# Patient Record
Sex: MALE | Race: BLACK OR AFRICAN AMERICAN | NOT HISPANIC OR LATINO | Employment: OTHER | URBAN - METROPOLITAN AREA
[De-identification: names, ages, dates, MRNs, and addresses within clinical notes are randomized per-mention and may not be internally consistent; named-entity substitution may affect disease eponyms.]

---

## 2017-01-16 ENCOUNTER — HISTORICAL (OUTPATIENT)
Dept: RADIOLOGY | Facility: HOSPITAL | Age: 62
End: 2017-01-16

## 2017-01-16 ENCOUNTER — HISTORICAL (OUTPATIENT)
Dept: ADMINISTRATIVE | Facility: HOSPITAL | Age: 62
End: 2017-01-16

## 2017-01-23 ENCOUNTER — HISTORICAL (OUTPATIENT)
Dept: RADIOLOGY | Facility: HOSPITAL | Age: 62
End: 2017-01-23

## 2017-03-27 ENCOUNTER — HISTORICAL (OUTPATIENT)
Dept: INFUSION THERAPY | Facility: HOSPITAL | Age: 62
End: 2017-03-27

## 2017-03-28 ENCOUNTER — HISTORICAL (OUTPATIENT)
Dept: INFUSION THERAPY | Facility: HOSPITAL | Age: 62
End: 2017-03-28

## 2017-04-10 ENCOUNTER — HISTORICAL (OUTPATIENT)
Dept: HEMATOLOGY/ONCOLOGY | Facility: CLINIC | Age: 62
End: 2017-04-10

## 2017-04-24 ENCOUNTER — HISTORICAL (OUTPATIENT)
Dept: INFUSION THERAPY | Facility: HOSPITAL | Age: 62
End: 2017-04-24

## 2017-04-25 ENCOUNTER — HISTORICAL (OUTPATIENT)
Dept: INFUSION THERAPY | Facility: HOSPITAL | Age: 62
End: 2017-04-25

## 2017-05-01 ENCOUNTER — HISTORICAL (OUTPATIENT)
Dept: ADMINISTRATIVE | Facility: HOSPITAL | Age: 62
End: 2017-05-01

## 2017-05-01 LAB
ABS NEUT (OLG): 5.88 X10(3)/MCL (ref 2.1–9.2)
ALBUMIN SERPL-MCNC: 3.6 GM/DL (ref 3.4–5)
ALBUMIN/GLOB SERPL: 1.2 RATIO (ref 1.1–2)
ALP SERPL-CCNC: 72 UNIT/L (ref 50–136)
ALT SERPL-CCNC: 24 UNIT/L (ref 12–78)
AST SERPL-CCNC: 18 UNIT/L (ref 15–37)
BASOPHILS # BLD AUTO: 0.1 X10(3)/MCL (ref 0–0.2)
BASOPHILS NFR BLD AUTO: 0.8 %
BILIRUB SERPL-MCNC: 0.4 MG/DL (ref 0.2–1)
BILIRUBIN DIRECT+TOT PNL SERPL-MCNC: 0.1 MG/DL (ref 0–0.5)
BILIRUBIN DIRECT+TOT PNL SERPL-MCNC: 0.3 MG/DL (ref 0–0.8)
BUN SERPL-MCNC: 25 MG/DL (ref 7–18)
CALCIUM SERPL-MCNC: 9 MG/DL (ref 8.5–10.1)
CHLORIDE SERPL-SCNC: 107 MMOL/L (ref 98–107)
CO2 SERPL-SCNC: 30 MMOL/L (ref 21–32)
CREAT SERPL-MCNC: 1.44 MG/DL (ref 0.7–1.3)
EOSINOPHIL # BLD AUTO: 0.3 X10(3)/MCL (ref 0–0.9)
EOSINOPHIL NFR BLD AUTO: 3.6 %
ERYTHROCYTE [DISTWIDTH] IN BLOOD BY AUTOMATED COUNT: 14 % (ref 11.5–17)
GLOBULIN SER-MCNC: 3 GM/DL (ref 2.4–3.5)
GLUCOSE SERPL-MCNC: 110 MG/DL (ref 74–106)
HCT VFR BLD AUTO: 45 % (ref 42–52)
HGB BLD-MCNC: 14.7 GM/DL (ref 14–18)
LYMPHOCYTES # BLD AUTO: 0.2 X10(3)/MCL (ref 0.6–4.6)
LYMPHOCYTES NFR BLD AUTO: 2.1 %
MCH RBC QN AUTO: 30.5 PG (ref 27–31)
MCHC RBC AUTO-ENTMCNC: 32.7 GM/DL (ref 33–36)
MCV RBC AUTO: 93.4 FL (ref 80–94)
MONOCYTES # BLD AUTO: 0.8 X10(3)/MCL (ref 0.1–1.3)
MONOCYTES NFR BLD AUTO: 11.1 %
NEUTROPHILS # BLD AUTO: 5.9 X10(3)/MCL (ref 2.1–9.2)
NEUTROPHILS NFR BLD AUTO: 82.4 %
PLATELET # BLD AUTO: 188 X10(3)/MCL (ref 130–400)
PMV BLD AUTO: 9.9 FL (ref 9.4–12.4)
POTASSIUM SERPL-SCNC: 4.4 MMOL/L (ref 3.5–5.1)
PROT SERPL-MCNC: 6.6 GM/DL (ref 6.4–8.2)
RBC # BLD AUTO: 4.82 X10(6)/MCL (ref 4.7–6.1)
SODIUM SERPL-SCNC: 144 MMOL/L (ref 136–145)
WBC # SPEC AUTO: 7.1 X10(3)/MCL (ref 4.5–11.5)

## 2017-05-04 ENCOUNTER — HISTORICAL (OUTPATIENT)
Dept: INFUSION THERAPY | Facility: HOSPITAL | Age: 62
End: 2017-05-04

## 2017-05-10 ENCOUNTER — HISTORICAL (OUTPATIENT)
Dept: INFUSION THERAPY | Facility: HOSPITAL | Age: 62
End: 2017-05-10

## 2017-05-22 ENCOUNTER — HISTORICAL (OUTPATIENT)
Dept: INFUSION THERAPY | Facility: HOSPITAL | Age: 62
End: 2017-05-22

## 2017-05-22 LAB
ABS NEUT (OLG): 4.68 X10(3)/MCL (ref 2.1–9.2)
ANION GAP SERPL CALC-SCNC: 19 MMOL/L
BASOPHILS # BLD AUTO: 0 X10(3)/MCL (ref 0–0.2)
BASOPHILS NFR BLD AUTO: 0.5 %
BUN SERPL-MCNC: 18 MG/DL (ref 7–18)
CHLORIDE SERPL-SCNC: 107 MMOL/L (ref 98–109)
CREAT SERPL-MCNC: 0.9 MG/DL (ref 0.6–1.3)
EOSINOPHIL # BLD AUTO: 0.1 X10(3)/MCL (ref 0–0.9)
EOSINOPHIL NFR BLD AUTO: 2.3 %
ERYTHROCYTE [DISTWIDTH] IN BLOOD BY AUTOMATED COUNT: 13.7 % (ref 11.5–17)
GLUCOSE SERPL-MCNC: 98 MG/DL (ref 70–105)
HCT VFR BLD AUTO: 43.3 % (ref 42–52)
HCT VFR BLD CALC: 43 % (ref 38–51)
HGB BLD-MCNC: 14.4 GM/DL (ref 14–18)
HGB BLD-MCNC: 14.6 MG/DL (ref 12–17)
LYMPHOCYTES # BLD AUTO: 0.3 X10(3)/MCL (ref 0.6–4.6)
LYMPHOCYTES NFR BLD AUTO: 5.6 %
MCH RBC QN AUTO: 30.6 PG (ref 27–31)
MCHC RBC AUTO-ENTMCNC: 33.3 GM/DL (ref 33–36)
MCV RBC AUTO: 91.9 FL (ref 80–94)
MONOCYTES # BLD AUTO: 0.6 X10(3)/MCL (ref 0.1–1.3)
MONOCYTES NFR BLD AUTO: 10.1 %
NEUTROPHILS # BLD AUTO: 4.7 X10(3)/MCL (ref 2.1–9.2)
NEUTROPHILS NFR BLD AUTO: 81.5 %
PLATELET # BLD AUTO: 205 X10(3)/MCL (ref 130–400)
PMV BLD AUTO: 9.9 FL (ref 9.4–12.4)
POC IONIZED CALCIUM: 1.16 MMOL/L (ref 1.12–1.32)
POC TCO2: 23 MMOL/L (ref 22–27)
POTASSIUM BLD-SCNC: 4.2 MMOL/L (ref 3.5–4.9)
RBC # BLD AUTO: 4.71 X10(6)/MCL (ref 4.7–6.1)
SODIUM BLD-SCNC: 143 MMOL/L (ref 138–146)
WBC # SPEC AUTO: 5.7 X10(3)/MCL (ref 4.5–11.5)

## 2017-05-23 ENCOUNTER — HISTORICAL (OUTPATIENT)
Dept: INFUSION THERAPY | Facility: HOSPITAL | Age: 62
End: 2017-05-23

## 2017-05-30 ENCOUNTER — HISTORICAL (OUTPATIENT)
Dept: ADMINISTRATIVE | Facility: HOSPITAL | Age: 62
End: 2017-05-30

## 2017-05-30 LAB
ABS NEUT (OLG): 4.03 X10(3)/MCL (ref 2.1–9.2)
ALBUMIN SERPL-MCNC: 3.5 GM/DL (ref 3.4–5)
ALBUMIN/GLOB SERPL: 1.2 {RATIO}
ALP SERPL-CCNC: 73 UNIT/L (ref 50–136)
ALT SERPL-CCNC: 26 UNIT/L (ref 12–78)
AST SERPL-CCNC: 12 UNIT/L (ref 15–37)
BASOPHILS # BLD AUTO: 0 X10(3)/MCL (ref 0–0.2)
BASOPHILS NFR BLD AUTO: 0.6 %
BILIRUB SERPL-MCNC: 0.3 MG/DL (ref 0.2–1)
BILIRUBIN DIRECT+TOT PNL SERPL-MCNC: 0.1 MG/DL (ref 0–0.2)
BILIRUBIN DIRECT+TOT PNL SERPL-MCNC: 0.2 MG/DL (ref 0–0.8)
BUN SERPL-MCNC: 21 MG/DL (ref 7–18)
CALCIUM SERPL-MCNC: 8.8 MG/DL (ref 8.5–10.1)
CHLORIDE SERPL-SCNC: 106 MMOL/L (ref 98–107)
CO2 SERPL-SCNC: 29 MMOL/L (ref 21–32)
CREAT SERPL-MCNC: 1.17 MG/DL (ref 0.7–1.3)
EOSINOPHIL # BLD AUTO: 0.1 X10(3)/MCL (ref 0–0.9)
EOSINOPHIL NFR BLD AUTO: 2.1 %
ERYTHROCYTE [DISTWIDTH] IN BLOOD BY AUTOMATED COUNT: 14.4 % (ref 11.5–17)
GLOBULIN SER-MCNC: 2.9 GM/DL (ref 2.4–3.5)
GLUCOSE SERPL-MCNC: 86 MG/DL (ref 74–106)
HCT VFR BLD AUTO: 41.6 % (ref 42–52)
HGB BLD-MCNC: 13.8 GM/DL (ref 14–18)
LYMPHOCYTES # BLD AUTO: 0.1 X10(3)/MCL (ref 0.6–4.6)
LYMPHOCYTES NFR BLD AUTO: 2.5 %
MCH RBC QN AUTO: 31.2 PG (ref 27–31)
MCHC RBC AUTO-ENTMCNC: 33.2 GM/DL (ref 33–36)
MCV RBC AUTO: 93.9 FL (ref 80–94)
MONOCYTES # BLD AUTO: 1 X10(3)/MCL (ref 0.1–1.3)
MONOCYTES NFR BLD AUTO: 18.7 %
NEUTROPHILS # BLD AUTO: 4 X10(3)/MCL (ref 2.1–9.2)
NEUTROPHILS NFR BLD AUTO: 76.1 %
PLATELET # BLD AUTO: 204 X10(3)/MCL (ref 130–400)
PMV BLD AUTO: 10 FL (ref 9.4–12.4)
POTASSIUM SERPL-SCNC: 4.1 MMOL/L (ref 3.5–5.1)
PROT SERPL-MCNC: 6.4 GM/DL (ref 6.4–8.2)
RBC # BLD AUTO: 4.43 X10(6)/MCL (ref 4.7–6.1)
SODIUM SERPL-SCNC: 142 MMOL/L (ref 136–145)
WBC # SPEC AUTO: 5.3 X10(3)/MCL (ref 4.5–11.5)

## 2017-06-03 ENCOUNTER — HISTORICAL (OUTPATIENT)
Dept: INFUSION THERAPY | Facility: HOSPITAL | Age: 62
End: 2017-06-03

## 2017-06-19 ENCOUNTER — HISTORICAL (OUTPATIENT)
Dept: INFUSION THERAPY | Facility: HOSPITAL | Age: 62
End: 2017-06-19

## 2017-06-19 LAB
ABS NEUT (OLG): 3.67 X10(3)/MCL (ref 2.1–9.2)
ALBUMIN SERPL-MCNC: 3.4 GM/DL (ref 3.4–5)
ALP SERPL-CCNC: 80 UNIT/L (ref 50–136)
ALT SERPL-CCNC: 30 UNIT/L (ref 12–78)
ANION GAP SERPL CALC-SCNC: 13 MMOL/L
AST SERPL-CCNC: 15 UNIT/L (ref 15–37)
BASOPHILS # BLD AUTO: 0 X10(3)/MCL (ref 0–0.2)
BASOPHILS NFR BLD AUTO: 0.4 %
BILIRUB SERPL-MCNC: 0.3 MG/DL (ref 0.2–1)
BILIRUBIN DIRECT+TOT PNL SERPL-MCNC: 0.1 MG/DL (ref 0–0.5)
BILIRUBIN DIRECT+TOT PNL SERPL-MCNC: 0.2 MG/DL (ref 0–0.8)
BUN SERPL-MCNC: 22 MG/DL (ref 7–18)
CHLORIDE SERPL-SCNC: 106 MMOL/L (ref 98–109)
CREAT SERPL-MCNC: 1.1 MG/DL (ref 0.6–1.3)
EOSINOPHIL # BLD AUTO: 0.1 X10(3)/MCL (ref 0–0.9)
EOSINOPHIL NFR BLD AUTO: 2.3 %
ERYTHROCYTE [DISTWIDTH] IN BLOOD BY AUTOMATED COUNT: 13.7 % (ref 11.5–17)
GLUCOSE SERPL-MCNC: 116 MG/DL (ref 70–105)
HCT VFR BLD AUTO: 41.4 % (ref 42–52)
HCT VFR BLD CALC: 41 % (ref 38–51)
HGB BLD-MCNC: 13.9 MG/DL (ref 12–17)
HGB BLD-MCNC: 14.3 GM/DL (ref 14–18)
LIVER PROFILE INTERP: NORMAL
LYMPHOCYTES # BLD AUTO: 0.3 X10(3)/MCL (ref 0.6–4.6)
LYMPHOCYTES NFR BLD AUTO: 6.6 %
MCH RBC QN AUTO: 31.3 PG (ref 27–31)
MCHC RBC AUTO-ENTMCNC: 34.5 GM/DL (ref 33–36)
MCV RBC AUTO: 90.6 FL (ref 80–94)
MONOCYTES # BLD AUTO: 0.6 X10(3)/MCL (ref 0.1–1.3)
MONOCYTES NFR BLD AUTO: 12.7 %
NEUTROPHILS # BLD AUTO: 3.7 X10(3)/MCL (ref 2.1–9.2)
NEUTROPHILS NFR BLD AUTO: 78 %
PLATELET # BLD AUTO: 179 X10(3)/MCL (ref 130–400)
PMV BLD AUTO: 9.8 FL (ref 9.4–12.4)
POC IONIZED CALCIUM: 1.23 MMOL/L (ref 1.12–1.32)
POC TCO2: 30 MMOL/L (ref 22–27)
POTASSIUM BLD-SCNC: 3.6 MMOL/L (ref 3.5–4.9)
PROT SERPL-MCNC: 6.5 GM/DL (ref 6.4–8.2)
RBC # BLD AUTO: 4.57 X10(6)/MCL (ref 4.7–6.1)
SODIUM BLD-SCNC: 144 MMOL/L (ref 138–146)
WBC # SPEC AUTO: 4.7 X10(3)/MCL (ref 4.5–11.5)

## 2017-06-20 ENCOUNTER — HISTORICAL (OUTPATIENT)
Dept: INFUSION THERAPY | Facility: HOSPITAL | Age: 62
End: 2017-06-20

## 2017-06-26 ENCOUNTER — HISTORICAL (OUTPATIENT)
Dept: INFUSION THERAPY | Facility: HOSPITAL | Age: 62
End: 2017-06-26

## 2017-06-26 LAB
ABS NEUT (OLG): 6.06 X10(3)/MCL (ref 2.1–9.2)
ALBUMIN SERPL-MCNC: 3.6 GM/DL (ref 3.4–5)
ALBUMIN/GLOB SERPL: 1.3 {RATIO}
ALP SERPL-CCNC: 72 UNIT/L (ref 50–136)
ALT SERPL-CCNC: 31 UNIT/L (ref 12–78)
AST SERPL-CCNC: 16 UNIT/L (ref 15–37)
BASOPHILS # BLD AUTO: 0 X10(3)/MCL (ref 0–0.2)
BASOPHILS NFR BLD AUTO: 0.1 %
BILIRUB SERPL-MCNC: 0.4 MG/DL (ref 0.2–1)
BILIRUBIN DIRECT+TOT PNL SERPL-MCNC: 0.1 MG/DL (ref 0–0.2)
BILIRUBIN DIRECT+TOT PNL SERPL-MCNC: 0.3 MG/DL (ref 0–0.8)
BUN SERPL-MCNC: 20 MG/DL (ref 7–18)
CALCIUM SERPL-MCNC: 9.2 MG/DL (ref 8.5–10.1)
CHLORIDE SERPL-SCNC: 103 MMOL/L (ref 98–107)
CO2 SERPL-SCNC: 32 MMOL/L (ref 21–32)
CREAT SERPL-MCNC: 1.29 MG/DL (ref 0.7–1.3)
EOSINOPHIL # BLD AUTO: 0.1 X10(3)/MCL (ref 0–0.9)
EOSINOPHIL NFR BLD AUTO: 1.4 %
ERYTHROCYTE [DISTWIDTH] IN BLOOD BY AUTOMATED COUNT: 14 % (ref 11.5–17)
GLOBULIN SER-MCNC: 2.7 GM/DL (ref 2.4–3.5)
GLUCOSE SERPL-MCNC: 72 MG/DL (ref 74–106)
HCT VFR BLD AUTO: 37.9 % (ref 42–52)
HGB BLD-MCNC: 13.3 GM/DL (ref 14–18)
LYMPHOCYTES # BLD AUTO: 0.2 X10(3)/MCL (ref 0.6–4.6)
LYMPHOCYTES NFR BLD AUTO: 2.5 %
MCH RBC QN AUTO: 31.7 PG (ref 27–31)
MCHC RBC AUTO-ENTMCNC: 35.1 GM/DL (ref 33–36)
MCV RBC AUTO: 90.5 FL (ref 80–94)
MONOCYTES # BLD AUTO: 0.7 X10(3)/MCL (ref 0.1–1.3)
MONOCYTES NFR BLD AUTO: 10.3 %
NEUTROPHILS # BLD AUTO: 6.1 X10(3)/MCL (ref 2.1–9.2)
NEUTROPHILS NFR BLD AUTO: 85.7 %
PLATELET # BLD AUTO: 212 X10(3)/MCL (ref 130–400)
PMV BLD AUTO: 9.5 FL (ref 9.4–12.4)
POTASSIUM SERPL-SCNC: 3.8 MMOL/L (ref 3.5–5.1)
PROT SERPL-MCNC: 6.3 GM/DL (ref 6.4–8.2)
RBC # BLD AUTO: 4.19 X10(6)/MCL (ref 4.7–6.1)
SODIUM SERPL-SCNC: 142 MMOL/L (ref 136–145)
WBC # SPEC AUTO: 7.1 X10(3)/MCL (ref 4.5–11.5)

## 2017-07-03 ENCOUNTER — HISTORICAL (OUTPATIENT)
Dept: HEMATOLOGY/ONCOLOGY | Facility: CLINIC | Age: 62
End: 2017-07-03

## 2017-07-03 LAB
ABS NEUT (OLG): 3.93 X10(3)/MCL (ref 2.1–9.2)
ALBUMIN SERPL-MCNC: 3.5 GM/DL (ref 3.4–5)
ALBUMIN/GLOB SERPL: 1.2 {RATIO}
ALP SERPL-CCNC: 77 UNIT/L (ref 50–136)
ALT SERPL-CCNC: 26 UNIT/L (ref 12–78)
AST SERPL-CCNC: 17 UNIT/L (ref 15–37)
BASOPHILS # BLD AUTO: 0 X10(3)/MCL (ref 0–0.2)
BASOPHILS NFR BLD AUTO: 0.4 %
BILIRUB SERPL-MCNC: 0.5 MG/DL (ref 0.2–1)
BILIRUBIN DIRECT+TOT PNL SERPL-MCNC: 0.1 MG/DL (ref 0–0.2)
BILIRUBIN DIRECT+TOT PNL SERPL-MCNC: 0.4 MG/DL (ref 0–0.8)
BUN SERPL-MCNC: 19 MG/DL (ref 7–18)
CALCIUM SERPL-MCNC: 8.8 MG/DL (ref 8.5–10.1)
CHLORIDE SERPL-SCNC: 109 MMOL/L (ref 98–107)
CO2 SERPL-SCNC: 25 MMOL/L (ref 21–32)
CREAT SERPL-MCNC: 1.2 MG/DL (ref 0.7–1.3)
EOSINOPHIL # BLD AUTO: 0.1 X10(3)/MCL (ref 0–0.9)
EOSINOPHIL NFR BLD AUTO: 1.2 %
ERYTHROCYTE [DISTWIDTH] IN BLOOD BY AUTOMATED COUNT: 14.3 % (ref 11.5–17)
GLOBULIN SER-MCNC: 2.8 GM/DL (ref 2.4–3.5)
GLUCOSE SERPL-MCNC: 74 MG/DL (ref 74–106)
HCT VFR BLD AUTO: 37.6 % (ref 42–52)
HGB BLD-MCNC: 12.8 GM/DL (ref 14–18)
LYMPHOCYTES # BLD AUTO: 0.1 X10(3)/MCL (ref 0.6–4.6)
LYMPHOCYTES NFR BLD AUTO: 2.8 %
MCH RBC QN AUTO: 31.3 PG (ref 27–31)
MCHC RBC AUTO-ENTMCNC: 34 GM/DL (ref 33–36)
MCV RBC AUTO: 91.9 FL (ref 80–94)
MONOCYTES # BLD AUTO: 0.8 X10(3)/MCL (ref 0.1–1.3)
MONOCYTES NFR BLD AUTO: 16.3 %
NEUTROPHILS # BLD AUTO: 3.9 X10(3)/MCL (ref 2.1–9.2)
NEUTROPHILS NFR BLD AUTO: 79.3 %
PLATELET # BLD AUTO: 200 X10(3)/MCL (ref 130–400)
PMV BLD AUTO: 9.4 FL (ref 9.4–12.4)
POTASSIUM SERPL-SCNC: 3.7 MMOL/L (ref 3.5–5.1)
PROT SERPL-MCNC: 6.3 GM/DL (ref 6.4–8.2)
RBC # BLD AUTO: 4.09 X10(6)/MCL (ref 4.7–6.1)
SODIUM SERPL-SCNC: 141 MMOL/L (ref 136–145)
WBC # SPEC AUTO: 5 X10(3)/MCL (ref 4.5–11.5)

## 2017-07-17 ENCOUNTER — HISTORICAL (OUTPATIENT)
Dept: INFUSION THERAPY | Facility: HOSPITAL | Age: 62
End: 2017-07-17

## 2017-07-17 LAB
ABS NEUT (OLG): 6.54 X10(3)/MCL (ref 2.1–9.2)
ANION GAP SERPL CALC-SCNC: 17 MMOL/L
BASOPHILS # BLD AUTO: 0 X10(3)/MCL (ref 0–0.2)
BASOPHILS NFR BLD AUTO: 0.3 %
BUN SERPL-MCNC: 16 MG/DL (ref 7–18)
CHLORIDE SERPL-SCNC: 107 MMOL/L (ref 98–109)
CREAT SERPL-MCNC: 1 MG/DL (ref 0.6–1.3)
EOSINOPHIL # BLD AUTO: 0 X10(3)/MCL (ref 0–0.9)
EOSINOPHIL NFR BLD AUTO: 0.6 %
ERYTHROCYTE [DISTWIDTH] IN BLOOD BY AUTOMATED COUNT: 14.2 % (ref 11.5–17)
GLUCOSE SERPL-MCNC: 95 MG/DL (ref 70–105)
HCT VFR BLD AUTO: 39.6 % (ref 42–52)
HCT VFR BLD CALC: 39 % (ref 38–51)
HGB BLD-MCNC: 13.3 MG/DL (ref 12–17)
HGB BLD-MCNC: 13.6 GM/DL (ref 14–18)
LYMPHOCYTES # BLD AUTO: 0.3 X10(3)/MCL (ref 0.6–4.6)
LYMPHOCYTES NFR BLD AUTO: 3.5 %
MCH RBC QN AUTO: 31.6 PG (ref 27–31)
MCHC RBC AUTO-ENTMCNC: 34.3 GM/DL (ref 33–36)
MCV RBC AUTO: 92.1 FL (ref 80–94)
MONOCYTES # BLD AUTO: 0.9 X10(3)/MCL (ref 0.1–1.3)
MONOCYTES NFR BLD AUTO: 11.2 %
NEUTROPHILS # BLD AUTO: 6.5 X10(3)/MCL (ref 2.1–9.2)
NEUTROPHILS NFR BLD AUTO: 84.4 %
PLATELET # BLD AUTO: 216 X10(3)/MCL (ref 130–400)
PMV BLD AUTO: 9.8 FL (ref 9.4–12.4)
POC IONIZED CALCIUM: 1.22 MMOL/L (ref 1.12–1.32)
POC TCO2: 25 MMOL/L (ref 22–27)
POTASSIUM BLD-SCNC: 3.6 MMOL/L (ref 3.5–4.9)
RBC # BLD AUTO: 4.3 X10(6)/MCL (ref 4.7–6.1)
SODIUM BLD-SCNC: 144 MMOL/L (ref 138–146)
WBC # SPEC AUTO: 7.8 X10(3)/MCL (ref 4.5–11.5)

## 2017-07-18 ENCOUNTER — HISTORICAL (OUTPATIENT)
Dept: INFUSION THERAPY | Facility: HOSPITAL | Age: 62
End: 2017-07-18

## 2017-07-24 ENCOUNTER — HISTORICAL (OUTPATIENT)
Dept: ADMINISTRATIVE | Facility: HOSPITAL | Age: 62
End: 2017-07-24

## 2017-07-24 LAB
ABS NEUT (OLG): 4.28 X10(3)/MCL (ref 2.1–9.2)
ALBUMIN SERPL-MCNC: 3.3 GM/DL (ref 3.4–5)
ALBUMIN/GLOB SERPL: 1.1 RATIO (ref 1.1–2)
ALP SERPL-CCNC: 75 UNIT/L (ref 50–136)
ALT SERPL-CCNC: 32 UNIT/L (ref 12–78)
AST SERPL-CCNC: 19 UNIT/L (ref 15–37)
BASOPHILS # BLD AUTO: 0 X10(3)/MCL (ref 0–0.2)
BASOPHILS NFR BLD AUTO: 0.2 %
BILIRUB SERPL-MCNC: 0.5 MG/DL (ref 0.2–1)
BILIRUBIN DIRECT+TOT PNL SERPL-MCNC: 0.1 MG/DL (ref 0–0.5)
BILIRUBIN DIRECT+TOT PNL SERPL-MCNC: 0.4 MG/DL (ref 0–0.8)
BUN SERPL-MCNC: 17 MG/DL (ref 7–18)
CALCIUM SERPL-MCNC: 9.2 MG/DL (ref 8.5–10.1)
CHLORIDE SERPL-SCNC: 112 MMOL/L (ref 98–107)
CO2 SERPL-SCNC: 27 MMOL/L (ref 21–32)
CREAT SERPL-MCNC: 1.16 MG/DL (ref 0.7–1.3)
EOSINOPHIL # BLD AUTO: 0 X10(3)/MCL (ref 0–0.9)
EOSINOPHIL NFR BLD AUTO: 0.6 %
ERYTHROCYTE [DISTWIDTH] IN BLOOD BY AUTOMATED COUNT: 14.5 % (ref 11.5–17)
GLOBULIN SER-MCNC: 3 GM/DL (ref 2.4–3.5)
GLUCOSE SERPL-MCNC: 85 MG/DL (ref 74–106)
HCT VFR BLD AUTO: 37.1 % (ref 42–52)
HGB BLD-MCNC: 12.7 GM/DL (ref 14–18)
LYMPHOCYTES # BLD AUTO: 0.1 X10(3)/MCL (ref 0.6–4.6)
LYMPHOCYTES NFR BLD AUTO: 2.5 %
MCH RBC QN AUTO: 31.7 PG (ref 27–31)
MCHC RBC AUTO-ENTMCNC: 34.2 GM/DL (ref 33–36)
MCV RBC AUTO: 92.5 FL (ref 80–94)
MONOCYTES # BLD AUTO: 0.7 X10(3)/MCL (ref 0.1–1.3)
MONOCYTES NFR BLD AUTO: 13.8 %
NEUTROPHILS # BLD AUTO: 4.3 X10(3)/MCL (ref 2.1–9.2)
NEUTROPHILS NFR BLD AUTO: 82.9 %
PLATELET # BLD AUTO: 219 X10(3)/MCL (ref 130–400)
PMV BLD AUTO: 9.7 FL (ref 9.4–12.4)
POTASSIUM SERPL-SCNC: 3.7 MMOL/L (ref 3.5–5.1)
PROT SERPL-MCNC: 6.3 GM/DL (ref 6.4–8.2)
RBC # BLD AUTO: 4.01 X10(6)/MCL (ref 4.7–6.1)
SODIUM SERPL-SCNC: 145 MMOL/L (ref 136–145)
WBC # SPEC AUTO: 5.2 X10(3)/MCL (ref 4.5–11.5)

## 2017-07-31 ENCOUNTER — HISTORICAL (OUTPATIENT)
Dept: HEMATOLOGY/ONCOLOGY | Facility: CLINIC | Age: 62
End: 2017-07-31

## 2017-07-31 LAB
ABS NEUT (OLG): 4.79 X10(3)/MCL (ref 2.1–9.2)
ALBUMIN SERPL-MCNC: 3.4 GM/DL (ref 3.4–5)
ALBUMIN/GLOB SERPL: 1.2 {RATIO}
ALP SERPL-CCNC: 84 UNIT/L (ref 50–136)
ALT SERPL-CCNC: 29 UNIT/L (ref 12–78)
AST SERPL-CCNC: 17 UNIT/L (ref 15–37)
BASOPHILS # BLD AUTO: 0 X10(3)/MCL (ref 0–0.2)
BASOPHILS NFR BLD AUTO: 0.2 %
BILIRUB SERPL-MCNC: 0.3 MG/DL (ref 0.2–1)
BILIRUBIN DIRECT+TOT PNL SERPL-MCNC: 0.1 MG/DL (ref 0–0.2)
BILIRUBIN DIRECT+TOT PNL SERPL-MCNC: 0.2 MG/DL (ref 0–0.8)
BUN SERPL-MCNC: 18 MG/DL (ref 7–18)
CALCIUM SERPL-MCNC: 8.6 MG/DL (ref 8.5–10.1)
CHLORIDE SERPL-SCNC: 108 MMOL/L (ref 98–107)
CO2 SERPL-SCNC: 25 MMOL/L (ref 21–32)
CREAT SERPL-MCNC: 1.11 MG/DL (ref 0.7–1.3)
EOSINOPHIL # BLD AUTO: 0 X10(3)/MCL (ref 0–0.9)
EOSINOPHIL NFR BLD AUTO: 0.7 %
ERYTHROCYTE [DISTWIDTH] IN BLOOD BY AUTOMATED COUNT: 14.6 % (ref 11.5–17)
GLOBULIN SER-MCNC: 2.9 GM/DL (ref 2.4–3.5)
GLUCOSE SERPL-MCNC: 91 MG/DL (ref 74–106)
HCT VFR BLD AUTO: 35.9 % (ref 42–52)
HGB BLD-MCNC: 12.2 GM/DL (ref 14–18)
LYMPHOCYTES # BLD AUTO: 0.2 X10(3)/MCL (ref 0.6–4.6)
LYMPHOCYTES NFR BLD AUTO: 3.4 %
MCH RBC QN AUTO: 31.8 PG (ref 27–31)
MCHC RBC AUTO-ENTMCNC: 34 GM/DL (ref 33–36)
MCV RBC AUTO: 93.5 FL (ref 80–94)
MONOCYTES # BLD AUTO: 0.8 X10(3)/MCL (ref 0.1–1.3)
MONOCYTES NFR BLD AUTO: 13.7 %
NEUTROPHILS # BLD AUTO: 4.8 X10(3)/MCL (ref 2.1–9.2)
NEUTROPHILS NFR BLD AUTO: 82 %
PLATELET # BLD AUTO: 250 X10(3)/MCL (ref 130–400)
PMV BLD AUTO: 10 FL (ref 9.4–12.4)
POTASSIUM SERPL-SCNC: 3.3 MMOL/L (ref 3.5–5.1)
PROT SERPL-MCNC: 6.3 GM/DL (ref 6.4–8.2)
RBC # BLD AUTO: 3.84 X10(6)/MCL (ref 4.7–6.1)
SODIUM SERPL-SCNC: 144 MMOL/L (ref 136–145)
WBC # SPEC AUTO: 5.8 X10(3)/MCL (ref 4.5–11.5)

## 2017-08-14 ENCOUNTER — HISTORICAL (OUTPATIENT)
Dept: INFUSION THERAPY | Facility: HOSPITAL | Age: 62
End: 2017-08-14

## 2017-08-14 LAB
ABS NEUT (OLG): 5.46 X10(3)/MCL (ref 2.1–9.2)
ANION GAP SERPL CALC-SCNC: 17 MMOL/L
BASOPHILS # BLD AUTO: 0 X10(3)/MCL (ref 0–0.2)
BASOPHILS NFR BLD AUTO: 0.2 %
BUN SERPL-MCNC: 18 MG/DL (ref 7–18)
CHLORIDE SERPL-SCNC: 108 MMOL/L (ref 98–109)
CREAT SERPL-MCNC: 1.1 MG/DL (ref 0.6–1.3)
EOSINOPHIL # BLD AUTO: 0 X10(3)/MCL (ref 0–0.9)
EOSINOPHIL NFR BLD AUTO: 0.6 %
ERYTHROCYTE [DISTWIDTH] IN BLOOD BY AUTOMATED COUNT: 14.9 % (ref 11.5–17)
GLUCOSE SERPL-MCNC: 113 MG/DL (ref 70–105)
HCT VFR BLD AUTO: 40.1 % (ref 42–52)
HCT VFR BLD CALC: 41 % (ref 38–51)
HGB BLD-MCNC: 13.6 GM/DL (ref 14–18)
HGB BLD-MCNC: 13.9 MG/DL (ref 12–17)
LYMPHOCYTES # BLD AUTO: 0.3 X10(3)/MCL (ref 0.6–4.6)
LYMPHOCYTES NFR BLD AUTO: 4.2 %
MCH RBC QN AUTO: 32.4 PG (ref 27–31)
MCHC RBC AUTO-ENTMCNC: 33.9 GM/DL (ref 33–36)
MCV RBC AUTO: 95.5 FL (ref 80–94)
MONOCYTES # BLD AUTO: 0.6 X10(3)/MCL (ref 0.1–1.3)
MONOCYTES NFR BLD AUTO: 10 %
NEUTROPHILS # BLD AUTO: 5.5 X10(3)/MCL (ref 2.1–9.2)
NEUTROPHILS NFR BLD AUTO: 85 %
PLATELET # BLD AUTO: 214 X10(3)/MCL (ref 130–400)
PMV BLD AUTO: 9.9 FL (ref 9.4–12.4)
POC IONIZED CALCIUM: 1.22 MMOL/L (ref 1.12–1.32)
POC TCO2: 25 MMOL/L (ref 22–27)
POTASSIUM BLD-SCNC: 3.7 MMOL/L (ref 3.5–4.9)
RBC # BLD AUTO: 4.2 X10(6)/MCL (ref 4.7–6.1)
SODIUM BLD-SCNC: 145 MMOL/L (ref 138–146)
WBC # SPEC AUTO: 6.4 X10(3)/MCL (ref 4.5–11.5)

## 2017-08-15 ENCOUNTER — HISTORICAL (OUTPATIENT)
Dept: INFUSION THERAPY | Facility: HOSPITAL | Age: 62
End: 2017-08-15

## 2017-08-21 ENCOUNTER — HISTORICAL (OUTPATIENT)
Dept: ADMINISTRATIVE | Facility: HOSPITAL | Age: 62
End: 2017-08-21

## 2017-08-21 LAB
ABS NEUT (OLG): 5 X10(3)/MCL (ref 2.1–9.2)
ALBUMIN SERPL-MCNC: 3.5 GM/DL (ref 3.4–5)
ALBUMIN/GLOB SERPL: 1.2 RATIO (ref 1.1–2)
ALP SERPL-CCNC: 75 UNIT/L (ref 50–136)
ALT SERPL-CCNC: 22 UNIT/L (ref 12–78)
AST SERPL-CCNC: 15 UNIT/L (ref 15–37)
BASOPHILS # BLD AUTO: 0 X10(3)/MCL (ref 0–0.2)
BASOPHILS NFR BLD AUTO: 0.2 %
BILIRUB SERPL-MCNC: 0.4 MG/DL (ref 0.2–1)
BILIRUBIN DIRECT+TOT PNL SERPL-MCNC: 0.1 MG/DL (ref 0–0.5)
BILIRUBIN DIRECT+TOT PNL SERPL-MCNC: 0.3 MG/DL (ref 0–0.8)
BUN SERPL-MCNC: 14 MG/DL (ref 7–18)
CALCIUM SERPL-MCNC: 8.7 MG/DL (ref 8.5–10.1)
CHLORIDE SERPL-SCNC: 110 MMOL/L (ref 98–107)
CO2 SERPL-SCNC: 28 MMOL/L (ref 21–32)
CREAT SERPL-MCNC: 1.18 MG/DL (ref 0.7–1.3)
EOSINOPHIL # BLD AUTO: 0 X10(3)/MCL (ref 0–0.9)
EOSINOPHIL NFR BLD AUTO: 0.7 %
ERYTHROCYTE [DISTWIDTH] IN BLOOD BY AUTOMATED COUNT: 14.7 % (ref 11.5–17)
GLOBULIN SER-MCNC: 2.9 GM/DL (ref 2.4–3.5)
GLUCOSE SERPL-MCNC: 91 MG/DL (ref 74–106)
HCT VFR BLD AUTO: 37.3 % (ref 42–52)
HGB BLD-MCNC: 12.6 GM/DL (ref 14–18)
LYMPHOCYTES # BLD AUTO: 0.1 X10(3)/MCL (ref 0.6–4.6)
LYMPHOCYTES NFR BLD AUTO: 1 %
MCH RBC QN AUTO: 32.5 PG (ref 27–31)
MCHC RBC AUTO-ENTMCNC: 33.8 GM/DL (ref 33–36)
MCV RBC AUTO: 96.1 FL (ref 80–94)
MONOCYTES # BLD AUTO: 0.8 X10(3)/MCL (ref 0.1–1.3)
MONOCYTES NFR BLD AUTO: 12.9 %
NEUTROPHILS # BLD AUTO: 5 X10(3)/MCL (ref 2.1–9.2)
NEUTROPHILS NFR BLD AUTO: 85.2 %
PLATELET # BLD AUTO: 195 X10(3)/MCL (ref 130–400)
PMV BLD AUTO: 9.6 FL (ref 9.4–12.4)
POTASSIUM SERPL-SCNC: 3.6 MMOL/L (ref 3.5–5.1)
PROT SERPL-MCNC: 6.4 GM/DL (ref 6.4–8.2)
RBC # BLD AUTO: 3.88 X10(6)/MCL (ref 4.7–6.1)
SODIUM SERPL-SCNC: 146 MMOL/L (ref 136–145)
WBC # SPEC AUTO: 5.9 X10(3)/MCL (ref 4.5–11.5)

## 2017-09-07 ENCOUNTER — HISTORICAL (OUTPATIENT)
Dept: INFUSION THERAPY | Facility: HOSPITAL | Age: 62
End: 2017-09-07

## 2017-10-12 ENCOUNTER — HISTORICAL (OUTPATIENT)
Dept: INFUSION THERAPY | Facility: HOSPITAL | Age: 62
End: 2017-10-12

## 2017-10-18 ENCOUNTER — HISTORICAL (OUTPATIENT)
Dept: INFUSION THERAPY | Facility: HOSPITAL | Age: 62
End: 2017-10-18

## 2017-12-14 ENCOUNTER — HISTORICAL (OUTPATIENT)
Dept: INFUSION THERAPY | Facility: HOSPITAL | Age: 62
End: 2017-12-14

## 2017-12-20 ENCOUNTER — HISTORICAL (OUTPATIENT)
Dept: ADMINISTRATIVE | Facility: HOSPITAL | Age: 62
End: 2017-12-20

## 2017-12-20 LAB
ABS NEUT (OLG): 6.34 X10(3)/MCL (ref 2.1–9.2)
ALBUMIN SERPL-MCNC: 3.6 GM/DL (ref 3.4–5)
ALBUMIN/GLOB SERPL: 1.2 RATIO (ref 1.1–2)
ALP SERPL-CCNC: 82 UNIT/L (ref 50–136)
ALT SERPL-CCNC: 19 UNIT/L (ref 12–78)
AST SERPL-CCNC: 12 UNIT/L (ref 15–37)
BASOPHILS # BLD AUTO: 0 X10(3)/MCL (ref 0–0.2)
BASOPHILS NFR BLD AUTO: 0.2 %
BILIRUB SERPL-MCNC: 0.3 MG/DL (ref 0.2–1)
BILIRUBIN DIRECT+TOT PNL SERPL-MCNC: 0.1 MG/DL (ref 0–0.5)
BILIRUBIN DIRECT+TOT PNL SERPL-MCNC: 0.2 MG/DL (ref 0–0.8)
BUN SERPL-MCNC: 18 MG/DL (ref 7–18)
CALCIUM SERPL-MCNC: 8.9 MG/DL (ref 8.5–10.1)
CHLORIDE SERPL-SCNC: 107 MMOL/L (ref 98–107)
CO2 SERPL-SCNC: 26 MMOL/L (ref 21–32)
CREAT SERPL-MCNC: 1.07 MG/DL (ref 0.7–1.3)
EOSINOPHIL # BLD AUTO: 0 X10(3)/MCL (ref 0–0.9)
EOSINOPHIL NFR BLD AUTO: 0.3 %
ERYTHROCYTE [DISTWIDTH] IN BLOOD BY AUTOMATED COUNT: 12.7 % (ref 11.5–17)
GLOBULIN SER-MCNC: 3 GM/DL (ref 2.4–3.5)
GLUCOSE SERPL-MCNC: 79 MG/DL (ref 74–106)
HCT VFR BLD AUTO: 39.1 % (ref 42–52)
HGB BLD-MCNC: 12.9 GM/DL (ref 14–18)
LDH SERPL-CCNC: 123 UNIT/L (ref 87–241)
LYMPHOCYTES # BLD AUTO: 0.1 X10(3)/MCL (ref 0.6–4.6)
LYMPHOCYTES NFR BLD AUTO: 2 %
MCH RBC QN AUTO: 32.6 PG (ref 27–31)
MCHC RBC AUTO-ENTMCNC: 33 GM/DL (ref 33–36)
MCV RBC AUTO: 98.7 FL (ref 80–94)
MONOCYTES # BLD AUTO: 0.2 X10(3)/MCL (ref 0.1–1.3)
MONOCYTES NFR BLD AUTO: 2.3 %
NEUTROPHILS # BLD AUTO: 6.3 X10(3)/MCL (ref 2.1–9.2)
NEUTROPHILS NFR BLD AUTO: 95.2 %
PLATELET # BLD AUTO: 240 X10(3)/MCL (ref 130–400)
PMV BLD AUTO: 9.5 FL (ref 9.4–12.4)
POTASSIUM SERPL-SCNC: 3.8 MMOL/L (ref 3.5–5.1)
PROT SERPL-MCNC: 6.6 GM/DL (ref 6.4–8.2)
RBC # BLD AUTO: 3.96 X10(6)/MCL (ref 4.7–6.1)
SODIUM SERPL-SCNC: 143 MMOL/L (ref 136–145)
WBC # SPEC AUTO: 6.6 X10(3)/MCL (ref 4.5–11.5)

## 2018-02-07 ENCOUNTER — HISTORICAL (OUTPATIENT)
Dept: INFUSION THERAPY | Facility: HOSPITAL | Age: 63
End: 2018-02-07

## 2018-04-04 ENCOUNTER — HISTORICAL (OUTPATIENT)
Dept: INFUSION THERAPY | Facility: HOSPITAL | Age: 63
End: 2018-04-04

## 2018-04-24 ENCOUNTER — HISTORICAL (OUTPATIENT)
Dept: ADMINISTRATIVE | Facility: HOSPITAL | Age: 63
End: 2018-04-24

## 2018-04-24 LAB
ABS NEUT (OLG): 3.72 X10(3)/MCL (ref 2.1–9.2)
ALBUMIN SERPL-MCNC: 3.8 GM/DL (ref 3.4–5)
ALBUMIN/GLOB SERPL: 1.5 RATIO (ref 1.1–2)
ALP SERPL-CCNC: 69 UNIT/L (ref 50–136)
ALT SERPL-CCNC: 27 UNIT/L (ref 12–78)
AST SERPL-CCNC: 19 UNIT/L (ref 15–37)
BASOPHILS # BLD AUTO: 0 X10(3)/MCL (ref 0–0.2)
BASOPHILS NFR BLD AUTO: 0.4 %
BILIRUB SERPL-MCNC: 0.8 MG/DL (ref 0.2–1)
BILIRUBIN DIRECT+TOT PNL SERPL-MCNC: 0.1 MG/DL (ref 0–0.5)
BILIRUBIN DIRECT+TOT PNL SERPL-MCNC: 0.7 MG/DL (ref 0–0.8)
BUN SERPL-MCNC: 15 MG/DL (ref 7–18)
CALCIUM SERPL-MCNC: 9.2 MG/DL (ref 8.5–10.1)
CHLORIDE SERPL-SCNC: 111 MMOL/L (ref 98–107)
CO2 SERPL-SCNC: 26 MMOL/L (ref 21–32)
CREAT SERPL-MCNC: 1.07 MG/DL (ref 0.7–1.3)
EOSINOPHIL # BLD AUTO: 0.1 X10(3)/MCL (ref 0–0.9)
EOSINOPHIL NFR BLD AUTO: 1.3 %
ERYTHROCYTE [DISTWIDTH] IN BLOOD BY AUTOMATED COUNT: 13.2 % (ref 11.5–17)
GLOBULIN SER-MCNC: 2.6 GM/DL (ref 2.4–3.5)
GLUCOSE SERPL-MCNC: 84 MG/DL (ref 74–106)
HCT VFR BLD AUTO: 39.6 % (ref 42–52)
HGB BLD-MCNC: 13.2 GM/DL (ref 14–18)
LDH SERPL-CCNC: 161 UNIT/L (ref 87–241)
LYMPHOCYTES # BLD AUTO: 0.3 X10(3)/MCL (ref 0.6–4.6)
LYMPHOCYTES NFR BLD AUTO: 7.3 %
MCH RBC QN AUTO: 32.3 PG (ref 27–31)
MCHC RBC AUTO-ENTMCNC: 33.3 GM/DL (ref 33–36)
MCV RBC AUTO: 96.8 FL (ref 80–94)
MONOCYTES # BLD AUTO: 0.5 X10(3)/MCL (ref 0.1–1.3)
MONOCYTES NFR BLD AUTO: 11.5 %
NEUTROPHILS # BLD AUTO: 3.7 X10(3)/MCL (ref 2.1–9.2)
NEUTROPHILS NFR BLD AUTO: 79.5 %
PLATELET # BLD AUTO: 224 X10(3)/MCL (ref 130–400)
PMV BLD AUTO: 9.2 FL (ref 9.4–12.4)
POTASSIUM SERPL-SCNC: 3.7 MMOL/L (ref 3.5–5.1)
PROT SERPL-MCNC: 6.4 GM/DL (ref 6.4–8.2)
RBC # BLD AUTO: 4.09 X10(6)/MCL (ref 4.7–6.1)
SODIUM SERPL-SCNC: 143 MMOL/L (ref 136–145)
WBC # SPEC AUTO: 4.7 X10(3)/MCL (ref 4.5–11.5)

## 2018-07-05 ENCOUNTER — HISTORICAL (OUTPATIENT)
Dept: INFUSION THERAPY | Facility: HOSPITAL | Age: 63
End: 2018-07-05

## 2018-07-24 ENCOUNTER — HISTORICAL (OUTPATIENT)
Dept: ADMINISTRATIVE | Facility: HOSPITAL | Age: 63
End: 2018-07-24

## 2018-07-24 LAB
ABS NEUT (OLG): 4.73 X10(3)/MCL (ref 2.1–9.2)
ALBUMIN SERPL-MCNC: 3.4 GM/DL (ref 3.4–5)
ALBUMIN/GLOB SERPL: 1.1 {RATIO}
ALP SERPL-CCNC: 83 UNIT/L (ref 50–136)
ALT SERPL-CCNC: 28 UNIT/L (ref 12–78)
AST SERPL-CCNC: 14 UNIT/L (ref 15–37)
BASOPHILS # BLD AUTO: 0 X10(3)/MCL (ref 0–0.2)
BASOPHILS NFR BLD AUTO: 0.2 %
BILIRUB SERPL-MCNC: 0.3 MG/DL (ref 0.2–1)
BILIRUBIN DIRECT+TOT PNL SERPL-MCNC: 0.1 MG/DL (ref 0–0.2)
BILIRUBIN DIRECT+TOT PNL SERPL-MCNC: 0.2 MG/DL (ref 0–0.8)
BUN SERPL-MCNC: 21 MG/DL (ref 7–18)
CALCIUM SERPL-MCNC: 8.7 MG/DL (ref 8.5–10.1)
CHLORIDE SERPL-SCNC: 112 MMOL/L (ref 98–107)
CO2 SERPL-SCNC: 26 MMOL/L (ref 21–32)
CREAT SERPL-MCNC: 1.11 MG/DL (ref 0.7–1.3)
EOSINOPHIL # BLD AUTO: 0.1 X10(3)/MCL (ref 0–0.9)
EOSINOPHIL NFR BLD AUTO: 1.5 %
ERYTHROCYTE [DISTWIDTH] IN BLOOD BY AUTOMATED COUNT: 13.3 % (ref 11.5–17)
GLOBULIN SER-MCNC: 3.1 GM/DL (ref 2.4–3.5)
GLUCOSE SERPL-MCNC: 107 MG/DL (ref 74–106)
HCT VFR BLD AUTO: 41.3 % (ref 42–52)
HGB BLD-MCNC: 13.6 GM/DL (ref 14–18)
LDH SERPL-CCNC: 155 UNIT/L (ref 87–241)
LYMPHOCYTES # BLD AUTO: 0.6 X10(3)/MCL (ref 0.6–4.6)
LYMPHOCYTES NFR BLD AUTO: 9.6 %
MCH RBC QN AUTO: 32.8 PG (ref 27–31)
MCHC RBC AUTO-ENTMCNC: 32.9 GM/DL (ref 33–36)
MCV RBC AUTO: 99.5 FL (ref 80–94)
MONOCYTES # BLD AUTO: 0.5 X10(3)/MCL (ref 0.1–1.3)
MONOCYTES NFR BLD AUTO: 8.9 %
NEUTROPHILS # BLD AUTO: 4.7 X10(3)/MCL (ref 2.1–9.2)
NEUTROPHILS NFR BLD AUTO: 79.8 %
PLATELET # BLD AUTO: 210 X10(3)/MCL (ref 130–400)
PMV BLD AUTO: 10.1 FL (ref 9.4–12.4)
POTASSIUM SERPL-SCNC: 3.9 MMOL/L (ref 3.5–5.1)
PROT SERPL-MCNC: 6.5 GM/DL (ref 6.4–8.2)
RBC # BLD AUTO: 4.15 X10(6)/MCL (ref 4.7–6.1)
SODIUM SERPL-SCNC: 144 MMOL/L (ref 136–145)
WBC # SPEC AUTO: 5.9 X10(3)/MCL (ref 4.5–11.5)

## 2018-09-06 ENCOUNTER — HISTORICAL (OUTPATIENT)
Dept: INFUSION THERAPY | Facility: HOSPITAL | Age: 63
End: 2018-09-06

## 2018-11-12 ENCOUNTER — HISTORICAL (OUTPATIENT)
Dept: INFUSION THERAPY | Facility: HOSPITAL | Age: 63
End: 2018-11-12

## 2018-12-17 ENCOUNTER — HISTORICAL (OUTPATIENT)
Dept: ADMINISTRATIVE | Facility: HOSPITAL | Age: 63
End: 2018-12-17

## 2018-12-17 LAB
ABS NEUT (OLG): 5.09 X10(3)/MCL (ref 2.1–9.2)
ALBUMIN SERPL-MCNC: 3.7 GM/DL (ref 3.4–5)
ALBUMIN/GLOB SERPL: 1.2 RATIO (ref 1.1–2)
ALP SERPL-CCNC: 78 UNIT/L (ref 50–136)
ALT SERPL-CCNC: 25 UNIT/L (ref 12–78)
AST SERPL-CCNC: 20 UNIT/L (ref 15–37)
BASOPHILS # BLD AUTO: 0 X10(3)/MCL (ref 0–0.2)
BASOPHILS NFR BLD AUTO: 0.3 %
BILIRUB SERPL-MCNC: 0.4 MG/DL (ref 0.2–1)
BILIRUBIN DIRECT+TOT PNL SERPL-MCNC: 0.1 MG/DL (ref 0–0.5)
BILIRUBIN DIRECT+TOT PNL SERPL-MCNC: 0.3 MG/DL (ref 0–0.8)
BUN SERPL-MCNC: 20 MG/DL (ref 7–18)
CALCIUM SERPL-MCNC: 9.2 MG/DL (ref 8.5–10.1)
CHLORIDE SERPL-SCNC: 109 MMOL/L (ref 98–107)
CO2 SERPL-SCNC: 26 MMOL/L (ref 21–32)
CREAT SERPL-MCNC: 1.14 MG/DL (ref 0.7–1.3)
EOSINOPHIL # BLD AUTO: 0 X10(3)/MCL (ref 0–0.9)
EOSINOPHIL NFR BLD AUTO: 0.8 %
ERYTHROCYTE [DISTWIDTH] IN BLOOD BY AUTOMATED COUNT: 12.5 % (ref 11.5–17)
GLOBULIN SER-MCNC: 3.2 GM/DL (ref 2.4–3.5)
GLUCOSE SERPL-MCNC: 88 MG/DL (ref 74–106)
HCT VFR BLD AUTO: 47.6 % (ref 42–52)
HGB BLD-MCNC: 15.4 GM/DL (ref 14–18)
LDH SERPL-CCNC: 182 UNIT/L (ref 87–241)
LYMPHOCYTES # BLD AUTO: 0.6 X10(3)/MCL (ref 0.6–4.6)
LYMPHOCYTES NFR BLD AUTO: 9.3 %
MCH RBC QN AUTO: 31 PG (ref 27–31)
MCHC RBC AUTO-ENTMCNC: 32.4 GM/DL (ref 33–36)
MCV RBC AUTO: 95.8 FL (ref 80–94)
MONOCYTES # BLD AUTO: 0.7 X10(3)/MCL (ref 0.1–1.3)
MONOCYTES NFR BLD AUTO: 10.2 %
NEUTROPHILS # BLD AUTO: 5.1 X10(3)/MCL (ref 2.1–9.2)
NEUTROPHILS NFR BLD AUTO: 78.8 %
PLATELET # BLD AUTO: 216 X10(3)/MCL (ref 130–400)
PMV BLD AUTO: 9.3 FL (ref 9.4–12.4)
POTASSIUM SERPL-SCNC: 4 MMOL/L (ref 3.5–5.1)
PROT SERPL-MCNC: 6.9 GM/DL (ref 6.4–8.2)
RBC # BLD AUTO: 4.97 X10(6)/MCL (ref 4.7–6.1)
SODIUM SERPL-SCNC: 142 MMOL/L (ref 136–145)
URATE SERPL-MCNC: 7.7 MG/DL (ref 2.6–7.2)
WBC # SPEC AUTO: 6.5 X10(3)/MCL (ref 4.5–11.5)

## 2019-03-13 ENCOUNTER — HISTORICAL (OUTPATIENT)
Dept: INFUSION THERAPY | Facility: HOSPITAL | Age: 64
End: 2019-03-13

## 2019-05-14 ENCOUNTER — HISTORICAL (OUTPATIENT)
Dept: INFUSION THERAPY | Facility: HOSPITAL | Age: 64
End: 2019-05-14

## 2019-06-17 ENCOUNTER — HISTORICAL (OUTPATIENT)
Dept: ADMINISTRATIVE | Facility: HOSPITAL | Age: 64
End: 2019-06-17

## 2019-06-17 LAB
ABS NEUT (OLG): 4.37 X10(3)/MCL (ref 2.1–9.2)
ALBUMIN SERPL-MCNC: 3.6 GM/DL (ref 3.4–5)
ALBUMIN/GLOB SERPL: 1.2 {RATIO}
ALP SERPL-CCNC: 64 UNIT/L (ref 50–136)
ALT SERPL-CCNC: 25 UNIT/L (ref 12–78)
AST SERPL-CCNC: 13 UNIT/L (ref 15–37)
BASOPHILS # BLD AUTO: 0 X10(3)/MCL (ref 0–0.2)
BASOPHILS NFR BLD AUTO: 0.7 %
BILIRUB SERPL-MCNC: 0.6 MG/DL (ref 0.2–1)
BILIRUBIN DIRECT+TOT PNL SERPL-MCNC: 0.1 MG/DL (ref 0–0.2)
BILIRUBIN DIRECT+TOT PNL SERPL-MCNC: 0.5 MG/DL (ref 0–0.8)
BUN SERPL-MCNC: 21 MG/DL (ref 7–18)
CALCIUM SERPL-MCNC: 9 MG/DL (ref 8.5–10.1)
CHLORIDE SERPL-SCNC: 109 MMOL/L (ref 98–107)
CO2 SERPL-SCNC: 30 MMOL/L (ref 21–32)
CREAT SERPL-MCNC: 1.2 MG/DL (ref 0.7–1.3)
EOSINOPHIL # BLD AUTO: 0.1 X10(3)/MCL (ref 0–0.9)
EOSINOPHIL NFR BLD AUTO: 1.7 %
ERYTHROCYTE [DISTWIDTH] IN BLOOD BY AUTOMATED COUNT: 13 % (ref 11.5–17)
GLOBULIN SER-MCNC: 3 GM/DL (ref 2.4–3.5)
GLUCOSE SERPL-MCNC: 95 MG/DL (ref 74–106)
HCT VFR BLD AUTO: 46.5 % (ref 42–52)
HGB BLD-MCNC: 14.9 GM/DL (ref 14–18)
LDH SERPL-CCNC: 186 UNIT/L (ref 87–241)
LYMPHOCYTES # BLD AUTO: 0.9 X10(3)/MCL (ref 0.6–4.6)
LYMPHOCYTES NFR BLD AUTO: 14.7 %
MCH RBC QN AUTO: 31 PG (ref 27–31)
MCHC RBC AUTO-ENTMCNC: 32 GM/DL (ref 33–36)
MCV RBC AUTO: 96.9 FL (ref 80–94)
MONOCYTES # BLD AUTO: 0.5 X10(3)/MCL (ref 0.1–1.3)
MONOCYTES NFR BLD AUTO: 8.4 %
NEUTROPHILS # BLD AUTO: 4.4 X10(3)/MCL (ref 2.1–9.2)
NEUTROPHILS NFR BLD AUTO: 73.8 %
PLATELET # BLD AUTO: 185 X10(3)/MCL (ref 130–400)
PMV BLD AUTO: 9 FL (ref 9.4–12.4)
POTASSIUM SERPL-SCNC: 4.4 MMOL/L (ref 3.5–5.1)
PROT SERPL-MCNC: 6.6 GM/DL (ref 6.4–8.2)
RBC # BLD AUTO: 4.8 X10(6)/MCL (ref 4.7–6.1)
SODIUM SERPL-SCNC: 142 MMOL/L (ref 136–145)
WBC # SPEC AUTO: 5.9 X10(3)/MCL (ref 4.5–11.5)

## 2019-07-08 ENCOUNTER — HISTORICAL (OUTPATIENT)
Dept: INFUSION THERAPY | Facility: HOSPITAL | Age: 64
End: 2019-07-08

## 2019-09-10 ENCOUNTER — HISTORICAL (OUTPATIENT)
Dept: INFUSION THERAPY | Facility: HOSPITAL | Age: 64
End: 2019-09-10

## 2019-11-14 ENCOUNTER — HISTORICAL (OUTPATIENT)
Dept: ADMINISTRATIVE | Facility: HOSPITAL | Age: 64
End: 2019-11-14

## 2020-01-02 ENCOUNTER — HISTORICAL (OUTPATIENT)
Dept: HEMATOLOGY/ONCOLOGY | Facility: CLINIC | Age: 65
End: 2020-01-02

## 2020-01-02 LAB
ABS NEUT (OLG): 4.13 X10(3)/MCL (ref 2.1–9.2)
ALBUMIN SERPL-MCNC: 2.4 GM/DL (ref 3.4–5)
ALBUMIN/GLOB SERPL: 0.6 RATIO (ref 1.1–2)
ALP SERPL-CCNC: 78 UNIT/L (ref 50–136)
ALT SERPL-CCNC: 31 UNIT/L (ref 12–78)
AST SERPL-CCNC: 23 UNIT/L (ref 15–37)
BASOPHILS # BLD AUTO: 0 X10(3)/MCL (ref 0–0.2)
BASOPHILS NFR BLD AUTO: 0.9 %
BILIRUB SERPL-MCNC: 0.2 MG/DL (ref 0.2–1)
BILIRUBIN DIRECT+TOT PNL SERPL-MCNC: 0.1 MG/DL (ref 0–0.5)
BILIRUBIN DIRECT+TOT PNL SERPL-MCNC: 0.1 MG/DL (ref 0–0.8)
BUN SERPL-MCNC: 21 MG/DL (ref 7–18)
CALCIUM SERPL-MCNC: 9.1 MG/DL (ref 8.5–10.1)
CHLORIDE SERPL-SCNC: 112 MMOL/L (ref 98–107)
CO2 SERPL-SCNC: 24 MMOL/L (ref 21–32)
CREAT SERPL-MCNC: 1.28 MG/DL (ref 0.7–1.3)
EOSINOPHIL # BLD AUTO: 0.1 X10(3)/MCL (ref 0–0.9)
EOSINOPHIL NFR BLD AUTO: 1.9 %
ERYTHROCYTE [DISTWIDTH] IN BLOOD BY AUTOMATED COUNT: 12.9 % (ref 11.5–17)
GLOBULIN SER-MCNC: 4.1 GM/DL (ref 2.4–3.5)
GLUCOSE SERPL-MCNC: 90 MG/DL (ref 74–106)
HCT VFR BLD AUTO: 45.5 % (ref 42–52)
HGB BLD-MCNC: 14.9 GM/DL (ref 14–18)
LDH SERPL-CCNC: 158 UNIT/L (ref 87–241)
LYMPHOCYTES # BLD AUTO: 0.9 X10(3)/MCL (ref 0.6–4.6)
LYMPHOCYTES NFR BLD AUTO: 14.9 %
MCH RBC QN AUTO: 31 PG (ref 27–31)
MCHC RBC AUTO-ENTMCNC: 32.7 GM/DL (ref 33–36)
MCV RBC AUTO: 94.8 FL (ref 80–94)
MONOCYTES # BLD AUTO: 0.6 X10(3)/MCL (ref 0.1–1.3)
MONOCYTES NFR BLD AUTO: 10.1 %
NEUTROPHILS # BLD AUTO: 4.1 X10(3)/MCL (ref 2.1–9.2)
NEUTROPHILS NFR BLD AUTO: 71.5 %
PLATELET # BLD AUTO: 196 X10(3)/MCL (ref 130–400)
PMV BLD AUTO: 9.7 FL (ref 9.4–12.4)
POTASSIUM SERPL-SCNC: 4 MMOL/L (ref 3.5–5.1)
PROT SERPL-MCNC: 6.5 GM/DL (ref 6.4–8.2)
RBC # BLD AUTO: 4.8 X10(6)/MCL (ref 4.7–6.1)
SODIUM SERPL-SCNC: 144 MMOL/L (ref 136–145)
WBC # SPEC AUTO: 5.8 X10(3)/MCL (ref 4.5–11.5)

## 2020-03-19 ENCOUNTER — HISTORICAL (OUTPATIENT)
Dept: INFUSION THERAPY | Facility: HOSPITAL | Age: 65
End: 2020-03-19

## 2020-05-14 ENCOUNTER — HISTORICAL (OUTPATIENT)
Dept: INFUSION THERAPY | Facility: HOSPITAL | Age: 65
End: 2020-05-14

## 2020-09-01 ENCOUNTER — HISTORICAL (OUTPATIENT)
Dept: ADMINISTRATIVE | Facility: HOSPITAL | Age: 65
End: 2020-09-01

## 2020-09-22 ENCOUNTER — HISTORICAL (OUTPATIENT)
Dept: ADMINISTRATIVE | Facility: HOSPITAL | Age: 65
End: 2020-09-22

## 2021-01-06 ENCOUNTER — HISTORICAL (OUTPATIENT)
Dept: HEMATOLOGY/ONCOLOGY | Facility: CLINIC | Age: 66
End: 2021-01-06

## 2021-01-06 LAB
ABS NEUT (OLG): 4.51 X10(3)/MCL (ref 2.1–9.2)
ALBUMIN SERPL-MCNC: 3.9 GM/DL (ref 3.4–4.8)
ALBUMIN/GLOB SERPL: 1.4 RATIO (ref 1.1–2)
ALP SERPL-CCNC: 66 UNIT/L (ref 40–150)
ALT SERPL-CCNC: 15 UNIT/L (ref 0–55)
APPEARANCE, UA: CLEAR
AST SERPL-CCNC: 18 UNIT/L (ref 5–34)
BACTERIA SPEC CULT: ABNORMAL /HPF
BASOPHILS # BLD AUTO: 0 X10(3)/MCL (ref 0–0.2)
BASOPHILS NFR BLD AUTO: 0.5 %
BILIRUB SERPL-MCNC: 0.4 MG/DL
BILIRUB UR QL STRIP: NEGATIVE
BILIRUBIN DIRECT+TOT PNL SERPL-MCNC: 0.2 MG/DL (ref 0–0.5)
BILIRUBIN DIRECT+TOT PNL SERPL-MCNC: 0.2 MG/DL (ref 0–0.8)
BUN SERPL-MCNC: 18.6 MG/DL (ref 8.4–25.7)
CALCIUM SERPL-MCNC: 8.9 MG/DL (ref 8.8–10)
CHLORIDE SERPL-SCNC: 110 MMOL/L (ref 98–107)
CO2 SERPL-SCNC: 25 MMOL/L (ref 23–31)
COLOR UR: YELLOW
CREAT SERPL-MCNC: 1.09 MG/DL (ref 0.73–1.18)
EOSINOPHIL # BLD AUTO: 0.1 X10(3)/MCL (ref 0–0.9)
EOSINOPHIL NFR BLD AUTO: 1.1 %
ERYTHROCYTE [DISTWIDTH] IN BLOOD BY AUTOMATED COUNT: 12.8 % (ref 11.5–17)
GLOBULIN SER-MCNC: 2.7 GM/DL (ref 2.4–3.5)
GLUCOSE (UA): NEGATIVE
GLUCOSE SERPL-MCNC: 84 MG/DL (ref 82–115)
HCT VFR BLD AUTO: 45.5 % (ref 42–52)
HGB BLD-MCNC: 15 GM/DL (ref 14–18)
HGB UR QL STRIP: ABNORMAL
KETONES UR QL STRIP: NEGATIVE
LEUKOCYTE ESTERASE UR QL STRIP: ABNORMAL
LYMPHOCYTES # BLD AUTO: 1.2 X10(3)/MCL (ref 0.6–4.6)
LYMPHOCYTES NFR BLD AUTO: 18.1 %
MCH RBC QN AUTO: 30.7 PG (ref 27–31)
MCHC RBC AUTO-ENTMCNC: 33 GM/DL (ref 33–36)
MCV RBC AUTO: 93 FL (ref 80–94)
MONOCYTES # BLD AUTO: 0.6 X10(3)/MCL (ref 0.1–1.3)
MONOCYTES NFR BLD AUTO: 9 %
NEUTROPHILS # BLD AUTO: 4.5 X10(3)/MCL (ref 2.1–9.2)
NEUTROPHILS NFR BLD AUTO: 71 %
NITRITE UR QL STRIP: NEGATIVE
PH UR STRIP: 5.5 [PH] (ref 5–9)
PLATELET # BLD AUTO: 211 X10(3)/MCL (ref 130–400)
PMV BLD AUTO: 9.8 FL (ref 9.4–12.4)
POTASSIUM SERPL-SCNC: 4.4 MMOL/L (ref 3.5–5.1)
PROT SERPL-MCNC: 6.6 GM/DL (ref 5.8–7.6)
PROT UR QL STRIP: ABNORMAL
RBC # BLD AUTO: 4.89 X10(6)/MCL (ref 4.7–6.1)
RBC #/AREA URNS HPF: 684 /HPF (ref 0–2)
SODIUM SERPL-SCNC: 144 MMOL/L (ref 136–145)
SP GR UR STRIP: 1.02 (ref 1–1.03)
SQUAMOUS EPITHELIAL, UA: ABNORMAL
UROBILINOGEN UR STRIP-ACNC: 1
VIT B12 SERPL-MCNC: 490 PG/ML (ref 213–816)
WBC # SPEC AUTO: 6.4 X10(3)/MCL (ref 4.5–11.5)
WBC #/AREA URNS HPF: ABNORMAL /HPF

## 2021-12-01 ENCOUNTER — HISTORICAL (OUTPATIENT)
Dept: HEMATOLOGY/ONCOLOGY | Facility: CLINIC | Age: 66
End: 2021-12-01

## 2021-12-01 LAB
ABS NEUT (OLG): 2.79 X10(3)/MCL (ref 2.1–9.2)
ALBUMIN SERPL-MCNC: 3.8 GM/DL (ref 3.4–4.8)
ALBUMIN/GLOB SERPL: 1.5 RATIO (ref 1.1–2)
ALP SERPL-CCNC: 59 UNIT/L (ref 40–150)
ALT SERPL-CCNC: 17 UNIT/L (ref 0–55)
AST SERPL-CCNC: 16 UNIT/L (ref 5–34)
BASOPHILS # BLD AUTO: 0 X10(3)/MCL (ref 0–0.2)
BASOPHILS NFR BLD AUTO: 1.2 %
BILIRUB SERPL-MCNC: 0.4 MG/DL
BILIRUBIN DIRECT+TOT PNL SERPL-MCNC: 0.1 MG/DL (ref 0–0.5)
BILIRUBIN DIRECT+TOT PNL SERPL-MCNC: 0.3 MG/DL (ref 0–0.8)
BUN SERPL-MCNC: 23.2 MG/DL (ref 8.4–25.7)
CALCIUM SERPL-MCNC: 9.5 MG/DL (ref 8.7–10.5)
CHLORIDE SERPL-SCNC: 107 MMOL/L (ref 98–107)
CHOLEST SERPL-MCNC: 285 MG/DL
CHOLEST/HDLC SERPL: 6 {RATIO} (ref 0–5)
CO2 SERPL-SCNC: 29 MMOL/L (ref 23–31)
CREAT SERPL-MCNC: 1.15 MG/DL (ref 0.73–1.18)
DEPRECATED CALCIDIOL+CALCIFEROL SERPL-MC: 44.4 NG/ML (ref 30–80)
EOSINOPHIL # BLD AUTO: 0 X10(3)/MCL (ref 0–0.9)
EOSINOPHIL NFR BLD AUTO: 1.2 %
ERYTHROCYTE [DISTWIDTH] IN BLOOD BY AUTOMATED COUNT: 13.7 % (ref 11.5–17)
GLOBULIN SER-MCNC: 2.6 GM/DL (ref 2.4–3.5)
GLUCOSE SERPL-MCNC: 96 MG/DL (ref 82–115)
HCT VFR BLD AUTO: 42.8 % (ref 42–52)
HDLC SERPL-MCNC: 48 MG/DL (ref 35–60)
HGB BLD-MCNC: 13.8 GM/DL (ref 14–18)
LDH SERPL-CCNC: 178 UNIT/L (ref 140–271)
LDLC SERPL CALC-MCNC: 220 MG/DL (ref 50–140)
LYMPHOCYTES # BLD AUTO: 0.7 X10(3)/MCL (ref 0.6–4.6)
LYMPHOCYTES NFR BLD AUTO: 18.2 %
MCH RBC QN AUTO: 30.4 PG (ref 27–31)
MCHC RBC AUTO-ENTMCNC: 32.2 GM/DL (ref 33–36)
MCV RBC AUTO: 94.3 FL (ref 80–94)
MONOCYTES # BLD AUTO: 0.4 X10(3)/MCL (ref 0.1–1.3)
MONOCYTES NFR BLD AUTO: 10.3 %
NEUTROPHILS # BLD AUTO: 2.8 X10(3)/MCL (ref 2.1–9.2)
NEUTROPHILS NFR BLD AUTO: 68.6 %
PLATELET # BLD AUTO: 178 X10(3)/MCL (ref 130–400)
PMV BLD AUTO: 9.5 FL (ref 9.4–12.4)
POTASSIUM SERPL-SCNC: 5 MMOL/L (ref 3.5–5.1)
PROT SERPL-MCNC: 6.4 GM/DL (ref 5.8–7.6)
RBC # BLD AUTO: 4.54 X10(6)/MCL (ref 4.7–6.1)
SODIUM SERPL-SCNC: 141 MMOL/L (ref 136–145)
TRIGL SERPL-MCNC: 85 MG/DL (ref 34–140)
VLDLC SERPL CALC-MCNC: 17 MG/DL
WBC # SPEC AUTO: 4.1 X10(3)/MCL (ref 4.5–11.5)

## 2022-04-11 ENCOUNTER — HISTORICAL (OUTPATIENT)
Dept: ADMINISTRATIVE | Facility: HOSPITAL | Age: 67
End: 2022-04-11

## 2022-04-28 VITALS
WEIGHT: 259.06 LBS | BODY MASS INDEX: 34.33 KG/M2 | SYSTOLIC BLOOD PRESSURE: 178 MMHG | HEIGHT: 73 IN | DIASTOLIC BLOOD PRESSURE: 105 MMHG

## 2022-04-30 NOTE — PROGRESS NOTES
Patient:   Chapin Nelson            MRN: [id number]            FIN: [id number]               Age:   62 years     Sex:  Male     :  1955   Associated Diagnoses:   Non-Hodgkin lymphoma, unspecified, intra-abdominal lymph nodes; Anemia; Dehydration   Author:   Adithya Rendon      Visit Information      Urologist: Dr. 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 CT-guided core biopsy retroperitoneal mass/lymph node done 14--low-grade B-cell lymphoma, CD20+, CD79a+, cyclin D1 negative, CD10-, BCL-6 negative, BCL-2 slightly positive. Marginal zone lymphoma vs. grade 1 follicular lymphoma.   2. Pelvic mass biopsy repeated at [facility] 10/29/14--lymphoid cells BCL-2 positive, BCL-6 and CD 10 negative, CD20 positive. Differential diagnosis includes low grade follicular lymphoma and marginal zone lymphoma, distinction is challenging.   3. Bone marrow biopsy done at [facility] 10/17/14--cellular bone marrow 30-40% with adequate trilineage hematopoiesis. No diagnostic morphologic evidence of involvement by lymphoproliferative disorder. Small population of kappa monotypic B-cells <1% of events. These findings suggest possible involvement.1 metaphase pseudodiploid, others normal.  Imagin. CT abdomen/pelvis w/ contrast done at [facility] 14--bilateral non-obstructing nephrolithiasis, 3.5X5.9cm retroperitoneal soft tissue encasing the left ureter, left common and external iliac vasculature. A few small left periaortic infrarenal lymph nodes are seen with mild surrounding fat stranding--largest measures 1.5X1.3cm.                2. PET/CT done 10/16/14--Left para-aortic adenopathy measures 1.5X1.6cm, soft tissue thickening noted in right hemipelvis, along the left external and internal iliac vessels measures 2.3X7.4cm.                 3. PET/CT done 3/5/15--Interval improvement with decreasing size and activity of abdominal  lymphoma.                4. CT neck/C/A/P done 6/5/15--nonspecific 12mm left supraclavicular node may be reactive; treated disease involving left common iliac and left external iliac region unchanged, slight increase in soft tissue from March not excluded. Definite improvement when compared to 10/2014.               5. CT neck/C/A/P done 9/9/15--no cervical adenopathy, partial thrombosis left external jugular vein; no change in soft tissue thickening along left iliac vessels c/w treated disease. No new findings.              6. PET 3/9/17--anatomically stable mildly FDG avid left common/external iliac soft tissue thickening measures 7.1X2.0cm and retroperitoneal lymph nodes. (increasing uptake), concern for a small mediastinal lymph node involvement.    Mediport placed on 4/21/17 in [place].     Treatment History: Weekly Rituximab X 4 [id number]/17/14--12/18/14, dose #4 delayed due to persistent diarrhea    Treatment Plan:    1. Bendamustine/Ritixumab x 6 cycles planned. Started 3/27/17.   Cycle # 5 due on 7/17/17. Neulasta on hold.       2. 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       Visit type:  Scheduled follow-up.    Accompanied by:  Spouse.       Chief Complaint   6/26/2017 13:34 CDT      diarrhea over the weekend-pt. states he feels better today.        Interval History   Current complaint: Patient presents for follow-up. He completed cycle 4 last week. Overall has tolerated well. He did have some diarrhea over the weekend for which he took Lomotil (max of 3 tabs/day). States that this is better today and he has not had to take any lomotil. He still feels like he is dehydrated today and is asking for IV fluids. He reports that his mediport site sore at times. No other complaints. Appetite is good. Weight is stable. He has lost 10 lbs in a month but admits that he is trying to lose some weight. No other problems reported.      Review of Systems    Constitutional:  Fatigue, Weight loss, No fever, No chills, No sweats, No weakness.    Eye:  No recent visual problem.    Ear/Nose/Mouth/Throat:  No decreased hearing, No nasal congestion, No sore throat.    Respiratory:  No shortness of breath, No cough, No wheezing.    Cardiovascular:  No chest pain, No palpitations, No peripheral edema.    Gastrointestinal:  Diarrhea, Worse over the weekend. Better today. Relieved with Lomotil, No nausea, No vomiting, No constipation, No abdominal pain.    Hematology/Lymphatics:  No bruising tendency, No bleeding tendency.    Musculoskeletal:  chronic pain--h/o fibromyalgia, No joint pain.    Integumentary:  Right CW mediport, No rash, No skin lesion.    Neurologic:  Alert and oriented X4, No confusion, No headache.    Psychiatric:  No anxiety, No depression.    All other systems are negative      Health Status   Allergies:    Allergic Reactions (Selected)  No Known Allergies   Current medications:  (Selected)   Prescriptions  Prescribed  Phenergan 25 mg Tab: 25 mg = 1 tab(s), Oral, q6hr, PRN PRN as needed for nausea/vomiting, # 30 tab(s), 1 Refill(s), Pharmacy: [id number]  Promethazine 25 mg ORAL Tab: 25 mg = 1 tab(s), Oral, q4hr, PRN PRN as needed for nausea/vomiting, # 60 tab(s), 1 Refill(s), Pharmacy: [id number]  Questran 4 g/9 g oral powder for reconstitution: 1 packet(s), Oral, TID, PRN PRN loose stool, # 90 packet(s), 3 Refill(s), Pharmacy: [id number]  Zofran 8 mg oral tablet: See Instructions, Take 1 tab twice daily for 3 days following each chemotherapy cycle. Then take every 8 hours as needed for nausea., # 30 tab(s), 3 Refill(s), Pharmacy: [id number]  Documented Medications  Documented  Aspir 81 oral delayed release tablet: 81 mg = 1 tab(s), Oral, Daily, # 30 tab(s), 0 Refill(s)  LOSARTAN-HCTZ 100-25 MG TAB: 1 tab(s), Oral, Daily  TNF Medl (Template NonFormulary): 0 Refill(s)  Vitamin C 500 mg oral tablet: 500 mg = 1  tab(s), Oral, Daily, # 30 tab(s), 0 Refill(s)      Histories   Past Medical History:    Resolved  can lie flat ([id number]):  Resolved.  polyps ([id number]):  Resolved.  Comments:  1/8/2014 CST 21:06 BRYAN Saeed RN Yadi CEBALLOS  4 years ago  Kidney stones ([id number]):  Resolved.  Comments:  1/8/2014 CST 21:07 BRYAN Saeed RN Yadi CEBALLOS  removed 8 months ago  skin disorder ([id number]):  Resolved.  Weakness ([id number]):  Resolved.   Family History:    Primary malignant neoplasm of breast  Sister  Pancreatic cancer  Brother  Diabetes mellitus type 2  Mother  Primary malignant neoplasm of prostate  Father  Brother  CAD (coronary artery disease)....  Father  Acute myocardial infarction.  Father  Cardiac arrest.  Mother     Procedure history:    Excision Mass/Cyst (None) on 1/13/2014 at 58 Years.  Comments:  1/13/2014 11:59 - Ashlyn PENG, Taran COSTA  auto-populated from documented surgical case  Colonoscopy ([id number]).  right rotator cuff.  renal stent.  Comments:  1/8/2014 21:11 - Christophe PENG Yadi L  8 months ago  lithotripsy x3.  mediport insertion.   Social History        Alcohol  Use: Current  Comment: occasionally drinks    Employment/School  Status: Employed  Description: [place]  Highest education: High school    Home/Environment  Lives with: Spouse    Tobacco  Use: Former smoker  Comment: Quit 34 years ago.        Physical Examination   Vital Signs   6/26/2017 13:34 CDT      Temperature Oral          36.9 DegC                             Peripheral Pulse Rate     88 bpm                             SpO2                      96 %                             Systolic Blood Pressure   129 mmHg                             Diastolic Blood Pressure  94 mmHg  HI     General:  Alert and oriented, No acute distress, very pleasant male.    Eye:  Extraocular movements are intact, Normal conjunctiva.    HENT:  Normocephalic, Normal hearing, Oral mucosa is moist.    Neck:   Supple, Non-tender, No jugular venous distention, No lymphadenopathy.    Respiratory:  Lungs are clear to auscultation, Respirations are non-labored, Breath sounds are equal.    Cardiovascular:  Normal rate, Regular rhythm, No murmur, No gallop, Normal peripheral perfusion, No edema.    Gastrointestinal:  Soft, Non-tender, Non-distended, Normal bowel sounds, No organomegaly.    Lymphatics:  No lymphadenopathy neck, axilla, groin.    Musculoskeletal:  Normal range of motion, Normal strength, No deformity, Normal gait.    Integumentary:  Warm, Dry, Intact, Right CW Mediport - healed well.    Neurologic:  Alert, Oriented, Normal sensory, Normal motor function.    Psychiatric:  Cooperative, Appropriate mood & affect.    Cognition and Speech:  Oriented, Speech clear and coherent.    ECOG Performance Scale: 1- Strenuous physical activity restricted; fully ambulatory and able to carry out light work.      Review / Management   Results review:  Lab results   6/26/2017 13:44 CDT      WBC                       7.1 x10(3)/mcL                             RBC                       4.19 x10(6)/mcL  LOW                             Hgb                       13.3 gm/dL  LOW                             Hct                       37.9 %  LOW                             Platelet                  212 x10(3)/mcL                             MCV                       90.5 fL                             MCH                       31.7 pg  HI                             MCHC                      35.1 gm/dL                             RDW                       14.0 %                             MPV                       9.5 fL                             Abs Neut                  6.06 x10(3)/mcL                             NEUT%                     85.7 %  NA                             NEUT#                     6.1 x10(3)/mcL                             LYMPH%                    2.5 %  NA                             LYMPH#                     0.2 x10(3)/mcL  LOW                             MONO%                     10.3 %  NA                             MONO#                     0.7 x10(3)/mcL                             EOS%                      1.4 %  NA                             EOS#                      0.1 x10(3)/mcL                             BASO%                     0.1 %  NA                             BASO#                     0.0 x10(3)/mcL  .       Impression and Plan   Diagnosis     Non-Hodgkin lymphoma, unspecified, intra-abdominal lymph nodes (PGR90-EJ C85.93).     Anemia (CXE73-FN D64.9).     Dehydration (MJK04-AQ E86.0).     Plan:  Patient with h/o NHL intra-abdominal completed 4 cycles Rituximab on 12/18/14.   Had difficulty with severe diarrhea. Diarrhea finally resolved after treatment with H. pylori.  He has been followed at [facility] with surveillance scanning and recent scan from 3/9/17 showed some mild progression.    Treatment with Benadmustine/Rituximab started on 3/27/17.  Patient is s/p cycle 4 and he tolerated well.  Had Mediport placement in [place] on 4/21/17.   CBCdiff today shows mild anemia only, otherwise normal.  Will give 1L of Normal saline today for mild dehydration.   CMP is pending today. Will follow-up.   Labs only next week.  RTC for labs and treatment only - Cycle # 5 on 7/17/17.   Follow-up in 4 weeks with labs.   Plan is for treatment with 6 cycles and then return to [facility] for restaging.  All questions answered at this time.    Patient was told to contact me with any problems before return to clinic.        IGGY [place]

## 2022-10-03 PROBLEM — M17.12 OSTEOARTHRITIS OF LEFT KNEE: Status: ACTIVE | Noted: 2022-10-03

## 2022-10-03 PROBLEM — E66.9 OBESITY: Status: ACTIVE | Noted: 2022-10-03

## 2022-10-03 PROBLEM — G54.2 DISORDER OF LEFT CERVICAL NERVE ROOT: Status: ACTIVE | Noted: 2022-10-03

## 2022-10-03 PROBLEM — C85.90 NON-HODGKIN'S LYMPHOMA: Status: ACTIVE | Noted: 2022-10-03

## 2022-10-24 ENCOUNTER — TELEPHONE (OUTPATIENT)
Dept: HEMATOLOGY/ONCOLOGY | Facility: CLINIC | Age: 67
End: 2022-10-24
Payer: MEDICARE

## 2022-10-24 DIAGNOSIS — C85.90 NON-HODGKIN'S LYMPHOMA, UNSPECIFIED BODY REGION, UNSPECIFIED NON-HODGKIN LYMPHOMA TYPE: Primary | ICD-10-CM

## 2022-10-24 NOTE — TELEPHONE ENCOUNTER
Patient called requesting f/u appt. Was N/S earlier this month. Having back discomfort and night sweats last night. Scheduled patient to come in 10/31/22. He will have labs same day. Could not come in this week. He is  out of town.

## 2022-10-25 PROBLEM — C82.03 FOLLICULAR LYMPHOMA GRADE I OF INTRA-ABDOMINAL LYMPH NODES: Status: ACTIVE | Noted: 2022-10-03

## 2022-10-25 NOTE — PROGRESS NOTES
Subjective:       Patient ID: Chapin Nelson is a 67 y.o. male.    Urologist: Dr. Paul Unger  [facility] oncologist: Dr. Ifeoma Gu  GI: Dr. Paul Nelson  PCP: Dr. 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 CT-guided core biopsy retroperitoneal mass/lymph node done 14--low-grade B-cell lymphoma, CD20+, CD79a+, cyclin D1 negative, CD10-, BCL-6 negative, BCL-2 slightly positive. Marginal zone lymphoma vs. grade 1 follicular lymphoma.  2. Pelvic mass biopsy repeated at [facility] 10/29/14--lymphoid cells BCL-2 positive, BCL-6 and CD 10 negative, CD20 positive. Differential diagnosis includes low grade follicular lymphoma and marginal zone lymphoma, distinction is challenging.  3. Bone marrow biopsy done at [facility] 10/17/14--cellular bone marrow 30-40% with adequate trilineage hematopoiesis. No diagnostic morphologic evidence of involvement by lymphoproliferative disorder. Small population of kappa monotypic B-cells <1% of events. These findings suggest possible involvement.1 metaphase pseudodiploid, others normal.  Imagin. CT abdomen/pelvis w/ contrast done at [facility] 14--bilateral non-obstructing nephrolithiasis, 3.5X5.9cm retroperitoneal soft tissue encasing the left ureter, left common and external iliac vasculature. A few small left periaortic infrarenal lymph nodes are seen with mild surrounding fat stranding--largest measures 1.5X1.3cm.  2. PET/CT done 10/16/14--Left para-aortic adenopathy measures 1.5X1.6cm, soft tissue thickening noted in right hemipelvis, along the left external and internal iliac vessels measures 2.3X7.4cm.   3. PET/CT done 3/5/15--Interval improvement with decreasing size and activity of abdominal lymphoma.  4. CT neck/C/A/P done 6/5/15--nonspecific 12mm left supraclavicular node may be reactive; treated disease involving left common iliac and left external iliac region unchanged, slight increase  in soft tissue from March not excluded. Definite improvement when compared to 10/2014.  5. CT neck/C/A/P done 9/9/15--no cervical adenopathy, partial thrombosis left external jugular vein; no change in soft tissue thickening along left iliac vessels c/w treated disease. No new findings.  6. PET 3/9/17--anatomically stable mildly FDG avid left common/external iliac soft tissue thickening measures 7.1X2.0cm and retroperitoneal lymph nodes. (increasing uptake), concern for a small mediastinal lymph node involvement.  7. PET/CT 9/11/17--No suspicious FDG-avid lesions identified, lymphoma 5-point scale score = 1.  8. CT neck/C/A/P done 2/28/18 at [facility]-No evidence of lymphoma.  9. CT C/A/P done at [facility] 9/20/18--DEMETRIA, stable multiple kidney stones bilaterally with cortical atrophy of left kidney.  10. CT neck/C/A/P done at [facility] on 3/25/19--No new progressive lymphadenopathy chest, abdomen and pelvis. No cervical lymphadenopathy.   11. CT C/A/P at [facility] on 6/28/20--Filing defect in the superior vena cava. Suggestion of the venous thrombosis associated with the central venous line. Stable lymph nodes in the chest. Grossly stable pulmonary nodules.   12. CT C/A/P at [facility] on 4/25/21--No evidence of active lymphoma in the chest, abdomen, and pelvis. 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 Interval radiation seed placement within the superior prostate gland. Two seeds appear to partially infiltrate into the bladder base.   13. CT C/A/P at [facility] 4/24/22--no change or evidence of residual or recurrent disease, bilateral nonobstructing nephrolithiasis noted.     Procedures:  1. Mediport placed on 4/21/17 in [place]. Removed July 2020.   2. Colonoscopy on 1/29/18 by Dr. Paul Nelson--Polyp in the ascending colon. Moderate diverticulosis in the left side of the colon. Normal mucosa in the terminal ileum. Otherwise normal exam. Biopsy unremarkable colonic mucosa.  Colon polyp tubular adenoma.   3. EGD on 2/1/18 by Dr. Paul Nelson--Hiatal hernia. Granularity and erythema in the GE junction compatible with esophagitis. Normal mucosa in the whole examined duodenum. Congestion and erythema in the cardia, fundus and stomach body compatible with gastritis. Duodenum biopsy with active duodenitis, no celiac sprue. Stomach antrum biopsy with mild reactive gatropathic changes in background of mild chronic gastritis. No H. pylori. GE junction biopsy unremarkable squamous mucosa.      Treatment History:   1. Weekly Rituximab X 4 [id number]/17/14--12/18/14, dose #4 delayed due to persistent diarrhea  2. Bendamustine/Ritixumab x 6 cycles completed 3/27/17---8/15/17.                2. 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   Lovenox BID x 6 months per [facility].      3. 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 Diagnosed on CT 6/28/20.   Eliquis twice daily since June 2020.         Treatment Plan: Observation--[facility] in April 2022.     Chief Complaint: Hot Flashes, Back Pain, and Rash    HPI  Patient presents for follow-up of low-grade B-cell lymphoma. He recently had 1 episode of feeling very hot inside, no drenching night sweats. But it went away and it did not happen again. No other B-symptoms. He also has a small rash on his shoulder. He was having some SI joint pain, and he underwent a steroid injection and pain improved. Now he only has his normal chronic back pain.     History reviewed. No pertinent past medical history.   Review of patient's allergies indicates:   Allergen Reactions    Ketorolac Hives and Rash      Current Outpatient Medications on File Prior to Visit   Medication Sig Dispense Refill    allopurinoL (ZYLOPRIM) 300 MG tablet allopurinol 300 mg tablet      HYDROcodone-acetaminophen (NORCO) 7.5-325 mg per tablet hydrocodone 7.5 mg-acetaminophen 325 mg tablet      lisinopriL (PRINIVIL,ZESTRIL) 20 MG tablet lisinopril 20 mg  tablet      meloxicam (MOBIC) 15 MG tablet meloxicam 15 mg tablet   TAKE 1 TABLET BY MOUTH ONCE DAILY      nebivoloL (BYSTOLIC) 5 MG Tab nebivolol 5 mg tablet      potassium citrate (UROCIT-K 15) 15 mEq TbSR potassium citrate ER 15 mEq (1,620 mg) tablet,extended release      RABEprazole (ACIPHEX) 20 mg tablet rabeprazole 20 mg tablet,delayed release   TAKE 1 TABLET BY MOUTH ONCE DAILY      tamsulosin (FLOMAX) 0.4 mg Cap tamsulosin 0.4 mg capsule      vit A,C &E-lutien-minerals tablet Take 1 capsule by mouth once daily.       No current facility-administered medications on file prior to visit.      Review of Systems   Constitutional:  Negative for appetite change, fatigue, fever and unexpected weight change.   HENT:  Negative for mouth sores.    Eyes: Negative.    Respiratory:  Negative for cough and shortness of breath.    Cardiovascular:  Negative for chest pain and leg swelling.   Gastrointestinal:  Negative for abdominal distention, abdominal pain, constipation, diarrhea, nausea, vomiting and reflux.   Genitourinary:  Negative for difficulty urinating, dysuria and hematuria.   Musculoskeletal:  Negative for arthralgias and back pain.   Integumentary:  Negative for rash.   Neurological:  Negative for weakness and headaches.   Hematological:  Negative for adenopathy.   Psychiatric/Behavioral:  Negative for sleep disturbance. The patient is not nervous/anxious.        Vitals:    10/31/22 0958   BP: (!) 155/99   Pulse: 65   Resp: 14   Temp: 97.5 °F (36.4 °C)          Physical Exam  Constitutional:       General: He is awake.      Appearance: Normal appearance.   HENT:      Head: Normocephalic and atraumatic.      Nose: Nose normal.      Mouth/Throat:      Mouth: Mucous membranes are moist.   Eyes:      General: Vision grossly intact.      Extraocular Movements: Extraocular movements intact.      Conjunctiva/sclera: Conjunctivae normal.   Cardiovascular:      Rate and Rhythm: Normal rate and regular rhythm.      Heart  sounds: Normal heart sounds.   Pulmonary:      Effort: Pulmonary effort is normal.      Breath sounds: Normal breath sounds.   Abdominal:      General: Bowel sounds are normal. There is no distension.      Palpations: Abdomen is soft.      Tenderness: There is no abdominal tenderness.   Musculoskeletal:      Cervical back: Normal range of motion and neck supple.      Right lower leg: No edema.      Left lower leg: No edema.   Lymphadenopathy:      Cervical: No cervical adenopathy.      Upper Body:      Right upper body: No supraclavicular adenopathy.      Left upper body: No supraclavicular adenopathy.   Skin:     General: Skin is warm.      Comments: Small circular lesion left shoulder c/w ringworm   Neurological:      Mental Status: He is alert and oriented to person, place, and time.      Motor: Motor function is intact.   Psychiatric:         Mood and Affect: Mood normal.         Speech: Speech normal.         Behavior: Behavior is cooperative.         Judgment: Judgment normal.       Lab Visit on 10/31/2022   Component Date Value    WBC 10/31/2022 6.2     RBC 10/31/2022 4.56 (L)     Hgb 10/31/2022 14.4     Hct 10/31/2022 44.3     MCV 10/31/2022 97.1 (H)     MCH 10/31/2022 31.6 (H)     MCHC 10/31/2022 32.5 (L)     RDW 10/31/2022 13.3     Platelet 10/31/2022 153     MPV 10/31/2022 9.5     Neut % 10/31/2022 78.0     Lymph % 10/31/2022 12.3     Mono % 10/31/2022 7.5     Eos % 10/31/2022 0.8     Basophil % 10/31/2022 0.6     Lymph # 10/31/2022 0.77     Neut # 10/31/2022 4.9     Mono # 10/31/2022 0.47     Eos # 10/31/2022 0.05     Baso # 10/31/2022 0.04     IG# 10/31/2022 0.05 (H)     IG% 10/31/2022 0.8             Assessment:       1. Follicular lymphoma grade I of intra-abdominal lymph nodes    2. Essential hypertension, benign         Plan:       Patient with h/o NHL low-grade B-cell with intra-abdominal lymph nodes, completed 4 cycles Rituximab on 12/18/14.   Had difficulty with severe diarrhea. Diarrhea finally  resolved after treatment with H. pylori.  He was followed at [facility] with surveillance scanning and scan from 3/9/17 showed some mild progression.     Treatment with Benadmustine/Rituximab recommended and started on 3/27/17.  Patient completed 6 cycles on 8/15/17.   PET/CT from 9/11/17 showed CR.  No maintenance Rituximab recommended due to h/o diarrhea.     Currently patient is doing well without any signs or symptoms to suggest recurrence of disease by labs or physical exam findings.  Last CT C/A/P done 4/2022 at [facility]-with DEMETRIA.  CBCdiff today is good. Will f/u CMP and LDH.  He only had this one episode of feeling hot, not really a night sweat and has not happened again. No other B-symptoms. I see no reason to do a scan unless LDH is elevated.  He is due to return to [facility] in 4/2023.  Will continue to alternate visits here.  RTC 12 months for follow-up with repeat labs.     All questions answered at this time.     Patient was told to contact me with any problems before return to clinic.          Sharron De Jesus MD    Addendum:  LDH is high. Scans and follow-up will be ordered.    Addendum:  CT C/A/P with no lymphoma, only a few lung nodules which have been there and they have been monitoring.  Patient notified.  Will have him follow-up in 1/2023 to repeat labs prior to his [facility] appointment coming up in 4/2023.    Sharron De Jesus MD

## 2022-10-31 ENCOUNTER — OFFICE VISIT (OUTPATIENT)
Dept: HEMATOLOGY/ONCOLOGY | Facility: CLINIC | Age: 67
End: 2022-10-31
Payer: MEDICARE

## 2022-10-31 ENCOUNTER — TELEPHONE (OUTPATIENT)
Dept: HEMATOLOGY/ONCOLOGY | Facility: CLINIC | Age: 67
End: 2022-10-31

## 2022-10-31 ENCOUNTER — LAB VISIT (OUTPATIENT)
Dept: LAB | Facility: HOSPITAL | Age: 67
End: 2022-10-31
Attending: INTERNAL MEDICINE
Payer: MEDICARE

## 2022-10-31 VITALS
RESPIRATION RATE: 14 BRPM | DIASTOLIC BLOOD PRESSURE: 99 MMHG | HEART RATE: 65 BPM | OXYGEN SATURATION: 97 % | SYSTOLIC BLOOD PRESSURE: 155 MMHG | HEIGHT: 72 IN | TEMPERATURE: 98 F | WEIGHT: 244.69 LBS | BODY MASS INDEX: 33.14 KG/M2

## 2022-10-31 DIAGNOSIS — I10 ESSENTIAL HYPERTENSION, BENIGN: ICD-10-CM

## 2022-10-31 DIAGNOSIS — C82.03 FOLLICULAR LYMPHOMA GRADE I OF INTRA-ABDOMINAL LYMPH NODES: Primary | ICD-10-CM

## 2022-10-31 DIAGNOSIS — R59.0 LOCALIZED ENLARGED LYMPH NODES: ICD-10-CM

## 2022-10-31 DIAGNOSIS — C85.90 NON-HODGKIN'S LYMPHOMA, UNSPECIFIED BODY REGION, UNSPECIFIED NON-HODGKIN LYMPHOMA TYPE: ICD-10-CM

## 2022-10-31 LAB
ALBUMIN SERPL-MCNC: 3.6 GM/DL (ref 3.4–4.8)
ALBUMIN/GLOB SERPL: 1.4 RATIO (ref 1.1–2)
ALP SERPL-CCNC: 55 UNIT/L (ref 40–150)
ALT SERPL-CCNC: 22 UNIT/L (ref 0–55)
AST SERPL-CCNC: 19 UNIT/L (ref 5–34)
BASOPHILS # BLD AUTO: 0.04 X10(3)/MCL (ref 0–0.2)
BASOPHILS NFR BLD AUTO: 0.6 %
BILIRUBIN DIRECT+TOT PNL SERPL-MCNC: 0.6 MG/DL
BUN SERPL-MCNC: 15.6 MG/DL (ref 8.4–25.7)
CALCIUM SERPL-MCNC: 8.9 MG/DL (ref 8.8–10)
CHLORIDE SERPL-SCNC: 108 MMOL/L (ref 98–107)
CO2 SERPL-SCNC: 26 MMOL/L (ref 23–31)
CREAT SERPL-MCNC: 1.07 MG/DL (ref 0.73–1.18)
EOSINOPHIL # BLD AUTO: 0.05 X10(3)/MCL (ref 0–0.9)
EOSINOPHIL NFR BLD AUTO: 0.8 %
ERYTHROCYTE [DISTWIDTH] IN BLOOD BY AUTOMATED COUNT: 13.3 % (ref 11.5–17)
GFR SERPLBLD CREATININE-BSD FMLA CKD-EPI: >60 MLS/MIN/1.73/M2
GLOBULIN SER-MCNC: 2.5 GM/DL (ref 2.4–3.5)
GLUCOSE SERPL-MCNC: 90 MG/DL (ref 82–115)
HCT VFR BLD AUTO: 44.3 % (ref 42–52)
HGB BLD-MCNC: 14.4 GM/DL (ref 14–18)
IMM GRANULOCYTES # BLD AUTO: 0.05 X10(3)/MCL (ref 0–0.04)
IMM GRANULOCYTES NFR BLD AUTO: 0.8 %
LDH SERPL-CCNC: 263 U/L (ref 125–220)
LYMPHOCYTES # BLD AUTO: 0.77 X10(3)/MCL (ref 0.6–4.6)
LYMPHOCYTES NFR BLD AUTO: 12.3 %
MCH RBC QN AUTO: 31.6 PG (ref 27–31)
MCHC RBC AUTO-ENTMCNC: 32.5 MG/DL (ref 33–36)
MCV RBC AUTO: 97.1 FL (ref 80–94)
MONOCYTES # BLD AUTO: 0.47 X10(3)/MCL (ref 0.1–1.3)
MONOCYTES NFR BLD AUTO: 7.5 %
NEUTROPHILS # BLD AUTO: 4.9 X10(3)/MCL (ref 2.1–9.2)
NEUTROPHILS NFR BLD AUTO: 78 %
PLATELET # BLD AUTO: 153 X10(3)/MCL (ref 130–400)
PMV BLD AUTO: 9.5 FL (ref 7.4–10.4)
POTASSIUM SERPL-SCNC: 4.3 MMOL/L (ref 3.5–5.1)
PROT SERPL-MCNC: 6.1 GM/DL (ref 5.8–7.6)
RBC # BLD AUTO: 4.56 X10(6)/MCL (ref 4.7–6.1)
SODIUM SERPL-SCNC: 140 MMOL/L (ref 136–145)
WBC # SPEC AUTO: 6.2 X10(3)/MCL (ref 4.5–11.5)

## 2022-10-31 PROCEDURE — 99999 PR PBB SHADOW E&M-EST. PATIENT-LVL IV: CPT | Mod: PBBFAC,,, | Performed by: INTERNAL MEDICINE

## 2022-10-31 PROCEDURE — 36415 COLL VENOUS BLD VENIPUNCTURE: CPT

## 2022-10-31 PROCEDURE — 99999 PR PBB SHADOW E&M-EST. PATIENT-LVL IV: ICD-10-PCS | Mod: PBBFAC,,, | Performed by: INTERNAL MEDICINE

## 2022-10-31 PROCEDURE — 83615 LACTATE (LD) (LDH) ENZYME: CPT

## 2022-10-31 PROCEDURE — 99214 OFFICE O/P EST MOD 30 MIN: CPT | Mod: PBBFAC | Performed by: INTERNAL MEDICINE

## 2022-10-31 PROCEDURE — 80053 COMPREHEN METABOLIC PANEL: CPT

## 2022-10-31 PROCEDURE — 99214 OFFICE O/P EST MOD 30 MIN: CPT | Mod: S$PBB,,, | Performed by: INTERNAL MEDICINE

## 2022-10-31 PROCEDURE — 99214 PR OFFICE/OUTPT VISIT, EST, LEVL IV, 30-39 MIN: ICD-10-PCS | Mod: S$PBB,,, | Performed by: INTERNAL MEDICINE

## 2022-10-31 PROCEDURE — 85025 COMPLETE CBC W/AUTO DIFF WBC: CPT

## 2022-10-31 RX ORDER — MELOXICAM 15 MG/1
TABLET ORAL
COMMUNITY
End: 2023-01-18

## 2022-10-31 RX ORDER — LISINOPRIL 20 MG/1
TABLET ORAL
COMMUNITY
Start: 2021-12-08 | End: 2023-01-18

## 2022-10-31 RX ORDER — POTASSIUM CITRATE 15 MEQ/1
TABLET, EXTENDED RELEASE ORAL
COMMUNITY
Start: 2021-12-08

## 2022-10-31 RX ORDER — TAMSULOSIN HYDROCHLORIDE 0.4 MG/1
CAPSULE ORAL
COMMUNITY

## 2022-10-31 RX ORDER — NEBIVOLOL 5 MG/1
TABLET ORAL
COMMUNITY

## 2022-10-31 RX ORDER — HYDROCODONE BITARTRATE AND ACETAMINOPHEN 7.5; 325 MG/1; MG/1
TABLET ORAL
COMMUNITY

## 2022-10-31 RX ORDER — RABEPRAZOLE SODIUM 20 MG/1
TABLET, DELAYED RELEASE ORAL
COMMUNITY

## 2022-10-31 RX ORDER — ALLOPURINOL 300 MG/1
TABLET ORAL
COMMUNITY
Start: 2021-12-08

## 2022-11-10 ENCOUNTER — HOSPITAL ENCOUNTER (OUTPATIENT)
Dept: RADIOLOGY | Facility: HOSPITAL | Age: 67
Discharge: HOME | End: 2022-11-10
Attending: NURSE PRACTITIONER
Payer: MEDICARE

## 2022-11-10 DIAGNOSIS — R59.0 LOCALIZED ENLARGED LYMPH NODES: ICD-10-CM

## 2022-11-10 DIAGNOSIS — C82.03 FOLLICULAR LYMPHOMA GRADE I OF INTRA-ABDOMINAL LYMPH NODES: ICD-10-CM

## 2022-11-10 PROCEDURE — 25500020 PHARM REV CODE 255: Mod: 59 | Performed by: NURSE PRACTITIONER

## 2022-11-10 PROCEDURE — 74177 CT ABD & PELVIS W/CONTRAST: CPT | Mod: TC

## 2022-11-10 PROCEDURE — 70492 CT SFT TSUE NCK W/O & W/DYE: CPT | Mod: TC

## 2022-11-10 PROCEDURE — 71260 CT THORAX DX C+: CPT | Mod: TC

## 2022-11-10 RX ADMIN — IOPAMIDOL 120 ML: 755 INJECTION, SOLUTION INTRAVENOUS at 04:11

## 2022-11-10 RX ADMIN — DIATRIZOATE MEGLUMINE AND DIATRIZOATE SODIUM 30 ML: 660; 100 LIQUID ORAL; RECTAL at 03:11

## 2022-11-14 ENCOUNTER — TELEPHONE (OUTPATIENT)
Dept: HEMATOLOGY/ONCOLOGY | Facility: CLINIC | Age: 67
End: 2022-11-14
Payer: MEDICARE

## 2023-01-09 ENCOUNTER — LAB VISIT (OUTPATIENT)
Dept: LAB | Facility: HOSPITAL | Age: 68
End: 2023-01-09
Attending: INTERNAL MEDICINE
Payer: MEDICARE

## 2023-01-09 DIAGNOSIS — C82.03 FOLLICULAR LYMPHOMA GRADE I OF INTRA-ABDOMINAL LYMPH NODES: ICD-10-CM

## 2023-01-09 DIAGNOSIS — I10 ESSENTIAL HYPERTENSION, BENIGN: ICD-10-CM

## 2023-01-09 LAB
ALBUMIN SERPL-MCNC: 3.7 G/DL (ref 3.4–4.8)
ALBUMIN/GLOB SERPL: 1.5 RATIO (ref 1.1–2)
ALP SERPL-CCNC: 62 UNIT/L (ref 40–150)
ALT SERPL-CCNC: 16 UNIT/L (ref 0–55)
AST SERPL-CCNC: 16 UNIT/L (ref 5–34)
BASOPHILS # BLD AUTO: 0.03 X10(3)/MCL (ref 0–0.2)
BASOPHILS NFR BLD AUTO: 0.6 %
BILIRUBIN DIRECT+TOT PNL SERPL-MCNC: 0.4 MG/DL
BUN SERPL-MCNC: 18.3 MG/DL (ref 8.4–25.7)
CALCIUM SERPL-MCNC: 9.3 MG/DL (ref 8.8–10)
CHLORIDE SERPL-SCNC: 106 MMOL/L (ref 98–107)
CO2 SERPL-SCNC: 26 MMOL/L (ref 23–31)
CREAT SERPL-MCNC: 1.29 MG/DL (ref 0.73–1.18)
EOSINOPHIL # BLD AUTO: 0.09 X10(3)/MCL (ref 0–0.9)
EOSINOPHIL NFR BLD AUTO: 1.7 %
ERYTHROCYTE [DISTWIDTH] IN BLOOD BY AUTOMATED COUNT: 12.7 % (ref 11.5–17)
GFR SERPLBLD CREATININE-BSD FMLA CKD-EPI: >60 MLS/MIN/1.73/M2
GLOBULIN SER-MCNC: 2.4 GM/DL (ref 2.4–3.5)
GLUCOSE SERPL-MCNC: 147 MG/DL (ref 82–115)
HCT VFR BLD AUTO: 43.2 % (ref 42–52)
HGB BLD-MCNC: 14.3 GM/DL (ref 14–18)
IMM GRANULOCYTES # BLD AUTO: 0.02 X10(3)/MCL (ref 0–0.04)
IMM GRANULOCYTES NFR BLD AUTO: 0.4 %
LDH SERPL-CCNC: 192 U/L (ref 125–220)
LYMPHOCYTES # BLD AUTO: 0.96 X10(3)/MCL (ref 0.6–4.6)
LYMPHOCYTES NFR BLD AUTO: 17.9 %
MCH RBC QN AUTO: 32.2 PG
MCHC RBC AUTO-ENTMCNC: 33.1 MG/DL (ref 33–36)
MCV RBC AUTO: 97.3 FL (ref 80–94)
MONOCYTES # BLD AUTO: 0.37 X10(3)/MCL (ref 0.1–1.3)
MONOCYTES NFR BLD AUTO: 6.9 %
NEUTROPHILS # BLD AUTO: 3.88 X10(3)/MCL (ref 2.1–9.2)
NEUTROPHILS NFR BLD AUTO: 72.5 %
PLATELET # BLD AUTO: 167 X10(3)/MCL (ref 130–400)
PMV BLD AUTO: 10 FL (ref 7.4–10.4)
POTASSIUM SERPL-SCNC: 3.9 MMOL/L (ref 3.5–5.1)
PROT SERPL-MCNC: 6.1 GM/DL (ref 5.8–7.6)
RBC # BLD AUTO: 4.44 X10(6)/MCL (ref 4.7–6.1)
SODIUM SERPL-SCNC: 140 MMOL/L (ref 136–145)
WBC # SPEC AUTO: 5.4 X10(3)/MCL (ref 4.5–11.5)

## 2023-01-09 PROCEDURE — 80053 COMPREHEN METABOLIC PANEL: CPT

## 2023-01-09 PROCEDURE — 85025 COMPLETE CBC W/AUTO DIFF WBC: CPT

## 2023-01-09 PROCEDURE — 36415 COLL VENOUS BLD VENIPUNCTURE: CPT

## 2023-01-09 PROCEDURE — 83615 LACTATE (LD) (LDH) ENZYME: CPT

## 2023-01-11 PROBLEM — M94.0 TIETZE'S DISEASE: Status: ACTIVE | Noted: 2022-04-26

## 2023-01-11 PROBLEM — G56.03 BILATERAL CARPAL TUNNEL SYNDROME: Status: ACTIVE | Noted: 2021-03-05

## 2023-01-11 PROBLEM — M47.816 LUMBAR SPONDYLOSIS: Status: ACTIVE | Noted: 2022-10-14

## 2023-01-11 PROBLEM — N40.0 BPH (BENIGN PROSTATIC HYPERPLASIA): Status: ACTIVE | Noted: 2023-01-11

## 2023-01-11 PROBLEM — M19.90 ARTHRITIS: Status: ACTIVE | Noted: 2023-01-11

## 2023-01-11 PROBLEM — M47.812 CERVICAL SPONDYLOSIS WITHOUT MYELOPATHY: Status: ACTIVE | Noted: 2021-04-16

## 2023-01-11 PROBLEM — E55.9 VITAMIN D DEFICIENCY: Status: ACTIVE | Noted: 2021-04-16

## 2023-01-11 PROBLEM — M54.50 LOW BACK PAIN: Status: ACTIVE | Noted: 2021-01-20

## 2023-01-11 NOTE — PROGRESS NOTES
Subjective:       Patient ID: Chapin Nelson is a 67 y.o. male.    Urologist: Dr. Paul Unger  [facility] oncologist: Dr. Ifeoma Gu  GI: Dr. Paul Nelson  PCP: Dr. 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 CT-guided core biopsy retroperitoneal mass/lymph node done 14--low-grade B-cell lymphoma, CD20+, CD79a+, cyclin D1 negative, CD10-, BCL-6 negative, BCL-2 slightly positive. Marginal zone lymphoma vs. grade 1 follicular lymphoma.  2. Pelvic mass biopsy repeated at [facility] 10/29/14--lymphoid cells BCL-2 positive, BCL-6 and CD 10 negative, CD20 positive. Differential diagnosis includes low grade follicular lymphoma and marginal zone lymphoma, distinction is challenging.  3. Bone marrow biopsy done at [facility] 10/17/14--cellular bone marrow 30-40% with adequate trilineage hematopoiesis. No diagnostic morphologic evidence of involvement by lymphoproliferative disorder. Small population of kappa monotypic B-cells <1% of events. These findings suggest possible involvement.1 metaphase pseudodiploid, others normal.  Imagin. CT abdomen/pelvis w/ contrast done at [facility] 14--bilateral non-obstructing nephrolithiasis, 3.5X5.9cm retroperitoneal soft tissue encasing the left ureter, left common and external iliac vasculature. A few small left periaortic infrarenal lymph nodes are seen with mild surrounding fat stranding--largest measures 1.5X1.3cm.  2. PET/CT done 10/16/14--Left para-aortic adenopathy measures 1.5X1.6cm, soft tissue thickening noted in right hemipelvis, along the left external and internal iliac vessels measures 2.3X7.4cm.   3. PET/CT done 3/5/15--Interval improvement with decreasing size and activity of abdominal lymphoma.  4. CT neck/C/A/P done 6/5/15--nonspecific 12mm left supraclavicular node may be reactive; treated disease involving left common iliac and left external iliac region unchanged, slight increase  in soft tissue from March not excluded. Definite improvement when compared to 10/2014.  5. CT neck/C/A/P done 9/9/15--no cervical adenopathy, partial thrombosis left external jugular vein; no change in soft tissue thickening along left iliac vessels c/w treated disease. No new findings.  6. PET 3/9/17--anatomically stable mildly FDG avid left common/external iliac soft tissue thickening measures 7.1X2.0cm and retroperitoneal lymph nodes. (increasing uptake), concern for a small mediastinal lymph node involvement.  7. PET/CT 9/11/17--No suspicious FDG-avid lesions identified, lymphoma 5-point scale score = 1.  8. CT neck/C/A/P done 2/28/18 at [facility]-No evidence of lymphoma.  9. CT C/A/P done at [facility] 9/20/18--DEMETRIA, stable multiple kidney stones bilaterally with cortical atrophy of left kidney.  10. CT neck/C/A/P done at [facility] on 3/25/19--No new progressive lymphadenopathy chest, abdomen and pelvis. No cervical lymphadenopathy.   11. CT C/A/P at [facility] on 6/28/20--Filing defect in the superior vena cava. Suggestion of the venous thrombosis associated with the central venous line. Stable lymph nodes in the chest. Grossly stable pulmonary nodules.   12. CT C/A/P at [facility] on 4/25/21--No evidence of active lymphoma in the chest, abdomen, and pelvis. 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 Interval radiation seed placement within the superior prostate gland. Two seeds appear to partially infiltrate into the bladder base.   13. CT C/A/P at [facility] 4/24/22--no change or evidence of residual or recurrent disease, bilateral nonobstructing nephrolithiasis noted.  14. CT N/C/A/P on 11/10/22--No cervical lymphadenopathy. No lymphadenopathy. Tiny, sub 6 mm pulmonary nodules. Patient reportedly has numerous outside prior CTs.  Recommend outside priors.     Procedures:  1. Mediport placed on 4/21/17 in [place]. Removed July 2020.   2. Colonoscopy on 1/29/18 by Dr. Miller  Leslie--Polyp in the ascending colon. Moderate diverticulosis in the left side of the colon. Normal mucosa in the terminal ileum. Otherwise normal exam. Biopsy unremarkable colonic mucosa. Colon polyp tubular adenoma.   3. EGD on 2/1/18 by Dr. Paul Nelson--Hiatal hernia. Granularity and erythema in the GE junction compatible with esophagitis. Normal mucosa in the whole examined duodenum. Congestion and erythema in the cardia, fundus and stomach body compatible with gastritis. Duodenum biopsy with active duodenitis, no celiac sprue. Stomach antrum biopsy with mild reactive gatropathic changes in background of mild chronic gastritis. No H. pylori. GE junction biopsy unremarkable squamous mucosa.      Treatment History:   1. Weekly Rituximab X 4 gyapcnytb62/17/14--12/18/14, dose #4 delayed due to persistent diarrhea  2. Bendamustine/Ritixumab x 6 cycles completed 3/27/17---8/15/17.                2. 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   Lovenox BID x 6 months per [facility].      3. 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 Diagnosed on CT 6/28/20.   Eliquis twice daily since June 2020.         Treatment Plan: Observation--[facility] in April 2023.    Chief Complaint: Other Misc (Pt reports all over body pain for the last 12 years.)    HPI  Patient presents for follow-up of low-grade B-cell lymphoma. At his last appointment, he mentioned 1 episode of feeling very hot inside, no drenching night sweats. His LDH was elevated so CT scans were obtained. No evidence of disease noted. No other B-symptoms. Repeat labs are good and LDH is back down to normal. Continues to complain of his normal chronic back pain. He is having a procedure done on Monday with Dr. Brooks to his left lower extremity. Plan is to stop Eliquis and start him on Plavix for a few months. No other problems reported today.     History reviewed. No pertinent past medical history.   Review of patient's allergies  indicates:   Allergen Reactions    Ketorolac Hives and Rash     Other reaction(s): Not Indicated      Current Outpatient Medications on File Prior to Visit   Medication Sig Dispense Refill    allopurinoL (ZYLOPRIM) 300 MG tablet allopurinol 300 mg tablet      apixaban (ELIQUIS) 5 mg Tab Eliquis 5 mg tablet   TAKE 1 TABLET BY MOUTH TWICE DAILY      atorvastatin (LIPITOR) 20 MG tablet atorvastatin 20 mg tablet   TAKE 1 TABLET BY MOUTH EVERY DAY AT BEDTIME      ELIQUIS 5 mg Tab Take 5 mg by mouth 2 (two) times daily.      HYDROcodone-acetaminophen (NORCO) 7.5-325 mg per tablet hydrocodone 7.5 mg-acetaminophen 325 mg tablet      losartan (COZAAR) 100 MG tablet losartan 100 mg tablet   TAKE 1 TABLET BY MOUTH ONCE DAILY      methocarbamoL (ROBAXIN) 750 MG Tab methocarbamol 750 mg tablet   Take 1 tablet 3 times a day by oral route as needed for 30 days.      nebivoloL (BYSTOLIC) 5 MG Tab nebivolol 5 mg tablet      potassium citrate (UROCIT-K 15) 15 mEq TbSR potassium citrate ER 15 mEq (1,620 mg) tablet,extended release      RABEprazole (ACIPHEX) 20 mg tablet rabeprazole 20 mg tablet,delayed release   TAKE 1 TABLET BY MOUTH ONCE DAILY      tamsulosin (FLOMAX) 0.4 mg Cap tamsulosin 0.4 mg capsule      vit A,C &E-lutien-minerals tablet Take 1 capsule by mouth once daily.      [DISCONTINUED] meloxicam (MOBIC) 15 MG tablet meloxicam 15 mg tablet   TAKE 1 TABLET BY MOUTH ONCE DAILY      acetaminophen (TYLENOL) 500 MG tablet Take 1,000 mg by mouth.      amitriptyline (ELAVIL) 25 MG tablet amitriptyline 25 mg tablet   Take 1 tablet as needed by oral route at bedtime for 30 days.      diclofenac sodium (VOLTAREN) 1 % Gel diclofenac 1 % topical gel   APPLY 2 GRAMS TO THE AFFECTED AREA(S) BY TOPICAL ROUTE 4 TIMES PER DAY AS NEEDED      diphenoxylate-atropine 2.5-0.025 mg (LOMOTIL) 2.5-0.025 mg per tablet Take 1 tablet by mouth.      ondansetron (ZOFRAN) 4 MG tablet ondansetron HCl 4 mg tablet   TAKE 1 TABLET BY MOUTH EVERY 8 HOURS AS  NEEDED FOR NAUSEA      vitamin E 1000 UNIT capsule Take 1 tablet by mouth once daily.      [DISCONTINUED] lisinopriL (PRINIVIL,ZESTRIL) 20 MG tablet lisinopril 20 mg tablet       No current facility-administered medications on file prior to visit.      Review of Systems   Constitutional:  Negative for appetite change, fatigue, fever and unexpected weight change.   HENT:  Negative for mouth sores.    Eyes: Negative.    Respiratory:  Negative for cough and shortness of breath.    Cardiovascular:  Positive for claudication. Negative for chest pain and leg swelling.   Gastrointestinal:  Negative for abdominal distention, abdominal pain, constipation, diarrhea, nausea, vomiting and reflux.   Genitourinary:  Negative for difficulty urinating, dysuria and hematuria.   Musculoskeletal:  Positive for back pain. Negative for arthralgias.        Chronic back and joint pains   Integumentary:  Negative for rash.   Neurological:  Negative for weakness and headaches.   Hematological:  Negative for adenopathy.   Psychiatric/Behavioral:  Negative for sleep disturbance. The patient is not nervous/anxious.        Vitals:    01/18/23 1405   BP: (!) 149/93   Pulse: 71   Resp: 14   Temp: 98.1 °F (36.7 °C)     Physical Exam  Constitutional:       General: He is awake.      Appearance: Normal appearance.   HENT:      Head: Normocephalic and atraumatic.      Nose: Nose normal.      Mouth/Throat:      Mouth: Mucous membranes are moist.   Eyes:      General: Vision grossly intact.      Extraocular Movements: Extraocular movements intact.      Conjunctiva/sclera: Conjunctivae normal.   Cardiovascular:      Rate and Rhythm: Normal rate and regular rhythm.      Heart sounds: Normal heart sounds.   Pulmonary:      Effort: Pulmonary effort is normal.      Breath sounds: Normal breath sounds.   Abdominal:      General: Bowel sounds are normal. There is no distension.      Palpations: Abdomen is soft.      Tenderness: There is no abdominal  tenderness.   Musculoskeletal:      Cervical back: Normal range of motion and neck supple.      Right lower leg: No edema.      Left lower leg: No edema.   Lymphadenopathy:      Cervical: No cervical adenopathy.      Upper Body:      Right upper body: No supraclavicular adenopathy.      Left upper body: No supraclavicular adenopathy.   Skin:     General: Skin is warm.   Neurological:      Mental Status: He is alert and oriented to person, place, and time.      Motor: Motor function is intact.   Psychiatric:         Mood and Affect: Mood normal.         Speech: Speech normal.         Behavior: Behavior is cooperative.         Judgment: Judgment normal.       Lab Visit on 01/09/2023   Component Date Value    Sodium Level 01/09/2023 140     Potassium Level 01/09/2023 3.9     Chloride 01/09/2023 106     Carbon Dioxide 01/09/2023 26     Glucose Level 01/09/2023 147 (H)     Blood Urea Nitrogen 01/09/2023 18.3     Creatinine 01/09/2023 1.29 (H)     Calcium Level Total 01/09/2023 9.3     Protein Total 01/09/2023 6.1     Albumin Level 01/09/2023 3.7     Globulin 01/09/2023 2.4     Albumin/Globulin Ratio 01/09/2023 1.5     Bilirubin Total 01/09/2023 0.4     Alkaline Phosphatase 01/09/2023 62     Alanine Aminotransferase 01/09/2023 16     Aspartate Aminotransfera* 01/09/2023 16     eGFR 01/09/2023 >60     Lactate Dehydrogenase 01/09/2023 192     WBC 01/09/2023 5.4     RBC 01/09/2023 4.44 (L)     Hgb 01/09/2023 14.3     Hct 01/09/2023 43.2     MCV 01/09/2023 97.3 (H)     MCH 01/09/2023 32.2     MCHC 01/09/2023 33.1     RDW 01/09/2023 12.7     Platelet 01/09/2023 167     MPV 01/09/2023 10.0     Neut % 01/09/2023 72.5     Lymph % 01/09/2023 17.9     Mono % 01/09/2023 6.9     Eos % 01/09/2023 1.7     Basophil % 01/09/2023 0.6     Lymph # 01/09/2023 0.96     Neut # 01/09/2023 3.88     Mono # 01/09/2023 0.37     Eos # 01/09/2023 0.09     Baso # 01/09/2023 0.03     IG# 01/09/2023 0.02     IG% 01/09/2023 0.4           Assessment:        1. Follicular lymphoma grade I of intra-abdominal lymph nodes         Plan:       Patient with h/o NHL low-grade B-cell with intra-abdominal lymph nodes, completed 4 cycles Rituximab on 12/18/14.   Had difficulty with severe diarrhea. Diarrhea finally resolved after treatment with H. pylori.  He was followed at [facility] with surveillance scanning and scan from 3/9/17 showed some mild progression.     Treatment with Benadmustine/Rituximab recommended and started on 3/27/17.  Patient completed 6 cycles on 8/15/17.   PET/CT from 9/11/17 showed CR.  No maintenance Rituximab recommended due to h/o diarrhea.     Currently patient is doing well without any signs or symptoms to suggest recurrence of disease by labs or physical exam findings.  He had one episode of feeling hot, not really a night sweat and has not happened again. LDH returned elevated.   CT of neck/C/A/P on 11/10/22 with no evidence of lymphoma, only a few lung nodules which have been present.   Repeat labs with mild renal insufficiency. LDH now normal.   He is due to return to [facility] in 4/2023.  Will continue to alternate visits here.  Will schedule a follow-up for October 2023.   All questions answered at this time.     Patient was told to contact me with any problems before return to clinic.        IG[facility] [place]

## 2023-01-18 ENCOUNTER — OFFICE VISIT (OUTPATIENT)
Dept: HEMATOLOGY/ONCOLOGY | Facility: CLINIC | Age: 68
End: 2023-01-18
Payer: MEDICARE

## 2023-01-18 VITALS
SYSTOLIC BLOOD PRESSURE: 149 MMHG | HEART RATE: 71 BPM | TEMPERATURE: 98 F | BODY MASS INDEX: 34.27 KG/M2 | HEIGHT: 72 IN | DIASTOLIC BLOOD PRESSURE: 93 MMHG | RESPIRATION RATE: 14 BRPM | WEIGHT: 253 LBS | OXYGEN SATURATION: 95 %

## 2023-01-18 DIAGNOSIS — C82.03 FOLLICULAR LYMPHOMA GRADE I OF INTRA-ABDOMINAL LYMPH NODES: Primary | ICD-10-CM

## 2023-01-18 PROCEDURE — 99999 PR PBB SHADOW E&M-EST. PATIENT-LVL IV: CPT | Mod: PBBFAC,,, | Performed by: NURSE PRACTITIONER

## 2023-01-18 PROCEDURE — 99999 PR PBB SHADOW E&M-EST. PATIENT-LVL IV: ICD-10-PCS | Mod: PBBFAC,,, | Performed by: NURSE PRACTITIONER

## 2023-01-18 PROCEDURE — 99213 PR OFFICE/OUTPT VISIT, EST, LEVL III, 20-29 MIN: ICD-10-PCS | Mod: S$PBB,,, | Performed by: NURSE PRACTITIONER

## 2023-01-18 PROCEDURE — 99214 OFFICE O/P EST MOD 30 MIN: CPT | Mod: PBBFAC | Performed by: NURSE PRACTITIONER

## 2023-01-18 PROCEDURE — 99213 OFFICE O/P EST LOW 20 MIN: CPT | Mod: S$PBB,,, | Performed by: NURSE PRACTITIONER

## 2023-01-18 RX ORDER — APIXABAN 5 MG/1
5 TABLET, FILM COATED ORAL 2 TIMES DAILY
COMMUNITY
Start: 2023-01-02

## 2023-01-18 RX ORDER — METHOCARBAMOL 750 MG/1
TABLET, FILM COATED ORAL
COMMUNITY

## 2023-01-18 RX ORDER — ACETAMINOPHEN 500 MG
1000 TABLET ORAL
COMMUNITY

## 2023-01-18 RX ORDER — ATORVASTATIN CALCIUM 20 MG/1
TABLET, FILM COATED ORAL
COMMUNITY

## 2023-01-18 RX ORDER — DICLOFENAC SODIUM 10 MG/G
GEL TOPICAL
COMMUNITY

## 2023-01-18 RX ORDER — AMITRIPTYLINE HYDROCHLORIDE 25 MG/1
TABLET, FILM COATED ORAL
COMMUNITY

## 2023-01-18 RX ORDER — PNV NO.95/FERROUS FUM/FOLIC AC 28MG-0.8MG
1 TABLET ORAL DAILY
COMMUNITY

## 2023-01-18 RX ORDER — LOSARTAN POTASSIUM 100 MG/1
TABLET ORAL
COMMUNITY

## 2023-01-18 RX ORDER — DIPHENOXYLATE HYDROCHLORIDE AND ATROPINE SULFATE 2.5; .025 MG/1; MG/1
1 TABLET ORAL
COMMUNITY

## 2023-01-18 RX ORDER — ONDANSETRON 4 MG/1
TABLET, FILM COATED ORAL
COMMUNITY

## 2023-08-10 ENCOUNTER — OFFICE VISIT (OUTPATIENT)
Dept: ORTHOPEDICS | Facility: CLINIC | Age: 68
End: 2023-08-10
Payer: MEDICARE

## 2023-08-10 ENCOUNTER — HOSPITAL ENCOUNTER (OUTPATIENT)
Dept: RADIOLOGY | Facility: CLINIC | Age: 68
Discharge: HOME | End: 2023-08-10
Attending: ORTHOPAEDIC SURGERY
Payer: MEDICARE

## 2023-08-10 VITALS — WEIGHT: 252 LBS | BODY MASS INDEX: 34.13 KG/M2 | HEIGHT: 72 IN

## 2023-08-10 DIAGNOSIS — Z79.01 CHRONIC ANTICOAGULATION: ICD-10-CM

## 2023-08-10 DIAGNOSIS — M48.02 CERVICAL STENOSIS OF SPINE: ICD-10-CM

## 2023-08-10 DIAGNOSIS — M25.512 CHRONIC LEFT SHOULDER PAIN: ICD-10-CM

## 2023-08-10 DIAGNOSIS — M75.102 LEFT ROTATOR CUFF TEAR ARTHROPATHY: Primary | ICD-10-CM

## 2023-08-10 DIAGNOSIS — R52 PAIN MANAGEMENT: ICD-10-CM

## 2023-08-10 DIAGNOSIS — G89.29 CHRONIC LEFT SHOULDER PAIN: ICD-10-CM

## 2023-08-10 DIAGNOSIS — M12.812 LEFT ROTATOR CUFF TEAR ARTHROPATHY: Primary | ICD-10-CM

## 2023-08-10 PROCEDURE — 73030 X-RAY EXAM OF SHOULDER: CPT | Mod: LT,,, | Performed by: ORTHOPAEDIC SURGERY

## 2023-08-10 PROCEDURE — 73030 XR SHOULDER COMPLETE 2 OR MORE VIEWS LEFT: ICD-10-PCS | Mod: LT,,, | Performed by: ORTHOPAEDIC SURGERY

## 2023-08-10 PROCEDURE — 99214 PR OFFICE/OUTPT VISIT, EST, LEVL IV, 30-39 MIN: ICD-10-PCS | Mod: 25,,, | Performed by: ORTHOPAEDIC SURGERY

## 2023-08-10 PROCEDURE — 99214 OFFICE O/P EST MOD 30 MIN: CPT | Mod: 25,,, | Performed by: ORTHOPAEDIC SURGERY

## 2023-08-10 PROCEDURE — 20610 PR DRAIN/INJECT LARGE JOINT/BURSA: ICD-10-PCS | Mod: LT,,, | Performed by: ORTHOPAEDIC SURGERY

## 2023-08-10 PROCEDURE — 20610 DRAIN/INJ JOINT/BURSA W/O US: CPT | Mod: LT,,, | Performed by: ORTHOPAEDIC SURGERY

## 2023-08-10 RX ORDER — BETAMETHASONE SODIUM PHOSPHATE AND BETAMETHASONE ACETATE 3; 3 MG/ML; MG/ML
6 INJECTION, SUSPENSION INTRA-ARTICULAR; INTRALESIONAL; INTRAMUSCULAR; SOFT TISSUE
Status: DISCONTINUED | OUTPATIENT
Start: 2023-08-10 | End: 2023-08-10 | Stop reason: HOSPADM

## 2023-08-10 RX ORDER — LIDOCAINE HYDROCHLORIDE 20 MG/ML
2 INJECTION, SOLUTION INFILTRATION; PERINEURAL
Status: DISCONTINUED | OUTPATIENT
Start: 2023-08-10 | End: 2023-08-10 | Stop reason: HOSPADM

## 2023-08-10 RX ORDER — AMLODIPINE BESYLATE 5 MG/1
5 TABLET ORAL NIGHTLY
COMMUNITY
Start: 2023-07-12

## 2023-08-10 RX ADMIN — BETAMETHASONE SODIUM PHOSPHATE AND BETAMETHASONE ACETATE 6 MG: 3; 3 INJECTION, SUSPENSION INTRA-ARTICULAR; INTRALESIONAL; INTRAMUSCULAR; SOFT TISSUE at 08:08

## 2023-08-10 RX ADMIN — LIDOCAINE HYDROCHLORIDE 2 MG: 20 INJECTION, SOLUTION INFILTRATION; PERINEURAL at 08:08

## 2023-08-10 NOTE — PROCEDURES
Large Joint Aspiration/Injection    Date/Time: 8/10/2023 8:30 AM    Performed by: Luis Enrique Torres MD  Authorized by: Luis Enrique Torres MD    Consent Done?:  Yes (Verbal)  Indications:  Pain  Timeout: prior to procedure the correct patient, procedure, and site was verified    Prep: patient was prepped and draped in usual sterile fashion      Details:  Needle Size:  25 G  Approach:  Posterior  Location:  Shoulder  Medications:  2 mg LIDOcaine HCL 20 mg/ml (2%) 20 mg/mL (2 %); 6 mg betamethasone acetate-betamethasone sodium phosphate 6 mg/mL  Patient tolerance:  Patient tolerated the procedure well with no immediate complications

## 2023-08-10 NOTE — PROGRESS NOTES
[facility]  Orthopedic Clinic Note      Chief Complaint:   Chief Complaint   Patient presents with    Left Shoulder - Pain    Shoulder Pain     about a month ago started have pain in left shoulder, had a MRI done and shows RTC tear     Referring Physician: No ref. provider found      History of Present Illness:    This is a 68 y.o. year old male presenting with complaints of left shoulder pain for approximately 4-6 weeks.  This shoulder pain is moderate to severe.  Patient reports increased pain with overhead movement.  Patient reports night pain.  Patient reports anterolateral shoulder pain that radiates down the arm.  MRI of the left shoulder demonstrated degenerative changes full thickness tear of the supraspinatus and acromioclavicular joint arthrosis.  Patient has been treated previously with prescribed narcotic medications and a prior rotator cuff repair.  He is unable to tolerate oral anti-inflammatories secondary to chronic anticoagulation.      Past Medical History:   Diagnosis Date    Cancer     Hypertension        Past Surgical History:   Procedure Laterality Date    APPENDECTOMY      KIDNEY SURGERY      KNEE SURGERY Left     SHOULDER SURGERY Left        Current Outpatient Medications   Medication Sig    acetaminophen (TYLENOL) 500 MG tablet Take 1,000 mg by mouth.    allopurinoL (ZYLOPRIM) 300 MG tablet allopurinol 300 mg tablet    amLODIPine (NORVASC) 5 MG tablet Take 5 mg by mouth every evening.    apixaban (ELIQUIS) 5 mg Tab Eliquis 5 mg tablet   TAKE 1 TABLET BY MOUTH TWICE DAILY    atorvastatin (LIPITOR) 20 MG tablet atorvastatin 20 mg tablet   TAKE 1 TABLET BY MOUTH EVERY DAY AT BEDTIME    diclofenac sodium (VOLTAREN) 1 % Gel diclofenac 1 % topical gel   APPLY 2 GRAMS TO THE AFFECTED AREA(S) BY TOPICAL ROUTE 4 TIMES PER DAY AS NEEDED    diphenoxylate-atropine 2.5-0.025 mg (LOMOTIL) 2.5-0.025 mg per tablet Take 1 tablet by mouth.    ELIQUIS 5 mg Tab Take 5 mg by mouth 2 (two) times daily.     HYDROcodone-acetaminophen (NORCO) 7.5-325 mg per tablet hydrocodone 7.5 mg-acetaminophen 325 mg tablet    losartan (COZAAR) 100 MG tablet losartan 100 mg tablet   TAKE 1 TABLET BY MOUTH ONCE DAILY    nebivoloL (BYSTOLIC) 5 MG Tab nebivolol 5 mg tablet    potassium citrate (UROCIT-K 15) 15 mEq TbSR potassium citrate ER 15 mEq (1,620 mg) tablet,extended release    tamsulosin (FLOMAX) 0.4 mg Cap tamsulosin 0.4 mg capsule    vit A,C &E-lutien-minerals tablet Take 1 capsule by mouth once daily.    vitamin E 1000 UNIT capsule Take 1 tablet by mouth once daily.    amitriptyline (ELAVIL) 25 MG tablet amitriptyline 25 mg tablet   Take 1 tablet as needed by oral route at bedtime for 30 days.    methocarbamoL (ROBAXIN) 750 MG Tab methocarbamol 750 mg tablet   Take 1 tablet 3 times a day by oral route as needed for 30 days.    ondansetron (ZOFRAN) 4 MG tablet ondansetron HCl 4 mg tablet   TAKE 1 TABLET BY MOUTH EVERY 8 HOURS AS NEEDED FOR NAUSEA    RABEprazole (ACIPHEX) 20 mg tablet rabeprazole 20 mg tablet,delayed release   TAKE 1 TABLET BY MOUTH ONCE DAILY     No current facility-administered medications for this visit.       Review of patient's allergies indicates:   Allergen Reactions    Ketorolac Hives and Rash     Other reaction(s): Not Indicated       Family History   Problem Relation Age of Onset    No Known Problems Mother     No Known Problems Father        Social History     Socioeconomic History    Marital status: Unknown   Tobacco Use    Smoking status: Former     Current packs/day: 0.00     Types: Cigars     Quit date:      Years since quittin.6    Smokeless tobacco: Never   Substance and Sexual Activity    Alcohol use: Yes     Alcohol/week: 2.0 standard drinks of alcohol     Types: 2 Cans of beer per week     Comment: socially    Drug use: Never    Sexual activity: Yes     Partners: Female         Review of Systems:  All review of systems negative except for those stated in the  HPI.    Examination:    Vital Signs:    Vitals:    08/10/23 0835   Weight: 114.3 kg (252 lb)   Height: 6' (1.829 m)   PainSc:   7       Body mass index is 34.18 kg/m².    Physical Examination:  General: Well-developed, well-nourished.  Neuro: Alert and oriented x 3.  Psych: Normal mood and affect.  Card: Regular rate and rhythm  Resp: Respirations regular and unlabored  Left Shoulder Exam:  No obvious deformity. No medial or lateral scapula winging.  Forward flexion to 160 degrees.  Abduction to 160 degrees.  External and internal rotation to 50 degrees.  4/5 strength, normal skin appearance and palpable pulses distally. Sensibility normal.       Imaging: X-rays ordered and images interpreted today personally by me of four views of the left shoulder demonstrate evidence of chondrocalcinosis, as well as advanced degenerative changes with osteophyte formation, narrowing of the glenohumeral joint space, and superior migration of the humeral head.      Assessment: Left rotator cuff tear arthropathy  -     X-Ray Shoulder 2 or More Views Left; Future; Expected date: 08/10/2023  -     Large Joint Aspiration/Injection    Cervical stenosis of spine    Pain management    Chronic anticoagulation        Plan: X-rays and prior MRI reviewed with the patient and his spouse.  Recommend surgical intervention via left reverse total shoulder arthroplasty to provide him definitive relief of symptoms.  We discussed that he may continue to have some symptoms in his left upper extremity secondary to his previously identified cervical spine stenosis.  He is interested in proceeding with surgical intervention, but has some upcoming projects to complete prior to proceeding.  He would like to proceed with surgical intervention 12/27/2023.  Plan to proceed with left subacromial corticosteroid injection today to provide him with some acute pain relief.  He will return to clinic 11/28/2023 for preoperative evaluation.  We will need to obtain  preoperative cardiac evaluation and recommendations regarding holding his Eliquis from his cardiologist, Viviane Brooks MD prior to proceeding.  He will also need a CT scan of his shoulder for surgical planning.  Continue previously prescribed medications as needed for pain.  He verbalized understanding of the plan of care with no further questions.    Currently followed by a pain management specialist who prescribed Norco 7.5 mg/325 mg.    Luis Enrique Torres MD personally performed the services described in this documentation, including but not limited to patient's history, physical examination, and assessment and plan of care. All medical record entries made by NJ Alvarez were performed at his direction and in his presence. The medical record was reviewed and is accurate and complete.    Large Joint Aspiration/Injection    Date/Time: 8/10/2023 8:30 AM    Performed by: Luis Enrique Torres MD  Authorized by: Luis Enrique Torres MD    Consent Done?:  Yes (Verbal)  Indications:  Pain  Timeout: prior to procedure the correct patient, procedure, and site was verified    Prep: patient was prepped and draped in usual sterile fashion      Details:  Needle Size:  25 G  Approach:  Posterior  Location:  Shoulder  Medications:  2 mg LIDOcaine HCL 20 mg/ml (2%) 20 mg/mL (2 %); 6 mg betamethasone acetate-betamethasone sodium phosphate 6 mg/mL  Patient tolerance:  Patient tolerated the procedure well with no immediate complications      Follow up for preoperative evaluation for left reverse total shoulder arthroplasty.      DISCLAIMER: This note may have been dictated using voice recognition software and may contain grammatical errors.     NOTE: Consult report sent to referring provider via [employer] EMR.

## 2023-10-23 ENCOUNTER — LAB VISIT (OUTPATIENT)
Dept: LAB | Facility: HOSPITAL | Age: 68
End: 2023-10-23
Attending: INTERNAL MEDICINE
Payer: MEDICARE

## 2023-10-23 DIAGNOSIS — C82.03 FOLLICULAR LYMPHOMA GRADE I OF INTRA-ABDOMINAL LYMPH NODES: ICD-10-CM

## 2023-10-23 PROBLEM — G56.03 BILATERAL CARPAL TUNNEL SYNDROME: Status: ACTIVE | Noted: 2021-03-04

## 2023-10-23 PROBLEM — I87.2 VENOUS INSUFFICIENCY: Status: ACTIVE | Noted: 2023-01-23

## 2023-10-23 PROBLEM — M54.50 LOW BACK PAIN: Status: ACTIVE | Noted: 2021-01-19

## 2023-10-23 PROBLEM — M47.817 LUMBOSACRAL SPONDYLOSIS WITHOUT MYELOPATHY: Status: ACTIVE | Noted: 2021-04-16

## 2023-10-23 PROBLEM — K57.30 DVRTCLOS OF LG INT W/O PERFORATION OR ABSCESS W/O BLEEDING: Status: ACTIVE | Noted: 2018-01-29

## 2023-10-23 PROBLEM — K29.70 GASTRITIS, UNSPECIFIED, WITHOUT BLEEDING: Status: ACTIVE | Noted: 2018-02-01

## 2023-10-23 PROBLEM — G47.30 SLEEP APNEA: Status: ACTIVE | Noted: 2023-10-23

## 2023-10-23 PROBLEM — K44.9 DIAPHRAGMATIC HERNIA: Status: ACTIVE | Noted: 2018-02-01

## 2023-10-23 LAB
ALBUMIN SERPL-MCNC: 3.8 G/DL (ref 3.4–4.8)
ALBUMIN/GLOB SERPL: 1.6 RATIO (ref 1.1–2)
ALP SERPL-CCNC: 56 UNIT/L (ref 40–150)
ALT SERPL-CCNC: 19 UNIT/L (ref 0–55)
AST SERPL-CCNC: 21 UNIT/L (ref 5–34)
BASOPHILS # BLD AUTO: 0.04 X10(3)/MCL
BASOPHILS NFR BLD AUTO: 0.8 %
BILIRUB SERPL-MCNC: 0.6 MG/DL
BUN SERPL-MCNC: 23.4 MG/DL (ref 8.4–25.7)
CALCIUM SERPL-MCNC: 9.1 MG/DL (ref 8.8–10)
CHLORIDE SERPL-SCNC: 108 MMOL/L (ref 98–107)
CO2 SERPL-SCNC: 26 MMOL/L (ref 23–31)
CREAT SERPL-MCNC: 1.72 MG/DL (ref 0.73–1.18)
EOSINOPHIL # BLD AUTO: 0.08 X10(3)/MCL (ref 0–0.9)
EOSINOPHIL NFR BLD AUTO: 1.6 %
ERYTHROCYTE [DISTWIDTH] IN BLOOD BY AUTOMATED COUNT: 13 % (ref 11.5–17)
GFR SERPLBLD CREATININE-BSD FMLA CKD-EPI: 43 MLS/MIN/1.73/M2
GLOBULIN SER-MCNC: 2.4 GM/DL (ref 2.4–3.5)
GLUCOSE SERPL-MCNC: 83 MG/DL (ref 82–115)
HCT VFR BLD AUTO: 43.8 % (ref 42–52)
HGB BLD-MCNC: 14.1 G/DL (ref 14–18)
IMM GRANULOCYTES # BLD AUTO: 0.02 X10(3)/MCL (ref 0–0.04)
IMM GRANULOCYTES NFR BLD AUTO: 0.4 %
LDH SERPL-CCNC: 215 U/L (ref 125–220)
LYMPHOCYTES # BLD AUTO: 1.08 X10(3)/MCL (ref 0.6–4.6)
LYMPHOCYTES NFR BLD AUTO: 21.4 %
MCH RBC QN AUTO: 31.5 PG (ref 27–31)
MCHC RBC AUTO-ENTMCNC: 32.2 G/DL (ref 33–36)
MCV RBC AUTO: 98 FL (ref 80–94)
MONOCYTES # BLD AUTO: 0.53 X10(3)/MCL (ref 0.1–1.3)
MONOCYTES NFR BLD AUTO: 10.5 %
NEUTROPHILS # BLD AUTO: 3.29 X10(3)/MCL (ref 2.1–9.2)
NEUTROPHILS NFR BLD AUTO: 65.3 %
PLATELET # BLD AUTO: 171 X10(3)/MCL (ref 130–400)
PMV BLD AUTO: 9.6 FL (ref 7.4–10.4)
POTASSIUM SERPL-SCNC: 4.1 MMOL/L (ref 3.5–5.1)
PROT SERPL-MCNC: 6.2 GM/DL (ref 5.8–7.6)
RBC # BLD AUTO: 4.47 X10(6)/MCL (ref 4.7–6.1)
SODIUM SERPL-SCNC: 142 MMOL/L (ref 136–145)
WBC # SPEC AUTO: 5.04 X10(3)/MCL (ref 4.5–11.5)

## 2023-10-23 PROCEDURE — 85025 COMPLETE CBC W/AUTO DIFF WBC: CPT

## 2023-10-23 PROCEDURE — 83615 LACTATE (LD) (LDH) ENZYME: CPT

## 2023-10-23 PROCEDURE — 80053 COMPREHEN METABOLIC PANEL: CPT

## 2023-10-23 PROCEDURE — 36415 COLL VENOUS BLD VENIPUNCTURE: CPT

## 2023-10-24 NOTE — PROGRESS NOTES
Subjective:       Patient ID: Chapin Nelson is a 68 y.o. male.    Urologist: Dr. Paul Unger  [facility] oncologist: Dr. Ifeoma Gu  GI: Dr. Paul Nelson  PCP: Dr. 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 CT-guided core biopsy retroperitoneal mass/lymph node done 14--low-grade B-cell lymphoma, CD20+, CD79a+, cyclin D1 negative, CD10-, BCL-6 negative, BCL-2 slightly positive. Marginal zone lymphoma vs. grade 1 follicular lymphoma.  2. Pelvic mass biopsy repeated at [facility] 10/29/14--lymphoid cells BCL-2 positive, BCL-6 and CD 10 negative, CD20 positive. Differential diagnosis includes low grade follicular lymphoma and marginal zone lymphoma, distinction is challenging.  3. Bone marrow biopsy done at [facility] 10/17/14--cellular bone marrow 30-40% with adequate trilineage hematopoiesis. No diagnostic morphologic evidence of involvement by lymphoproliferative disorder. Small population of kappa monotypic B-cells <1% of events. These findings suggest possible involvement.1 metaphase pseudodiploid, others normal.  Imagin. CT abdomen/pelvis w/ contrast done at [facility] 14--bilateral non-obstructing nephrolithiasis, 3.5X5.9cm retroperitoneal soft tissue encasing the left ureter, left common and external iliac vasculature. A few small left periaortic infrarenal lymph nodes are seen with mild surrounding fat stranding--largest measures 1.5X1.3cm.  2. PET/CT done 10/16/14--Left para-aortic adenopathy measures 1.5X1.6cm, soft tissue thickening noted in right hemipelvis, along the left external and internal iliac vessels measures 2.3X7.4cm.   3. PET/CT done 3/5/15--Interval improvement with decreasing size and activity of abdominal lymphoma.  4. CT neck/C/A/P done 6/5/15--nonspecific 12mm left supraclavicular node may be reactive; treated disease involving left common iliac and left external iliac region unchanged, slight increase  in soft tissue from March not excluded. Definite improvement when compared to 10/2014.  5. CT neck/C/A/P done 9/9/15--no cervical adenopathy, partial thrombosis left external jugular vein; no change in soft tissue thickening along left iliac vessels c/w treated disease. No new findings.  6. PET 3/9/17--anatomically stable mildly FDG avid left common/external iliac soft tissue thickening measures 7.1X2.0cm and retroperitoneal lymph nodes. (increasing uptake), concern for a small mediastinal lymph node involvement.  7. PET/CT 9/11/17--No suspicious FDG-avid lesions identified, lymphoma 5-point scale score = 1.  8. CT neck/C/A/P done 2/28/18 at [facility]-No evidence of lymphoma.  9. CT C/A/P done at [facility] 9/20/18--DEMETRIA, stable multiple kidney stones bilaterally with cortical atrophy of left kidney.  10. CT neck/C/A/P done at [facility] on 3/25/19--No new progressive lymphadenopathy chest, abdomen and pelvis. No cervical lymphadenopathy.   11. CT C/A/P at [facility] on 6/28/20--Filing defect in the superior vena cava. Suggestion of the venous thrombosis associated with the central venous line. Stable lymph nodes in the chest. Grossly stable pulmonary nodules.   12. CT C/A/P at [facility] on 4/25/21--No evidence of active lymphoma in the chest, abdomen, and pelvis. 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 Interval radiation seed placement within the superior prostate gland. Two seeds appear to partially infiltrate into the bladder base.   13. CT C/A/P at [facility] 4/24/22--no change or evidence of residual or recurrent disease, bilateral nonobstructing nephrolithiasis noted.  14. CT N/C/A/P on 11/10/22--No cervical lymphadenopathy. No lymphadenopathy. Tiny, sub 6 mm pulmonary nodules. Patient reportedly has numerous outside prior CTs.  Recommend outside priors.  15. CT N/C/A/P on 4/9/23--No evidence for either lymphoma the neck. No acute sinusitis. No CT evidence of recurrent lymphoma  in the chest, abdomen or pelvis. Bilateral nephrolithiasis and incidental findings as discussed above, similar to the previous exam.     Procedures:  1. Mediport placed on 4/21/17 in [place]. Removed July 2020.   2. Colonoscopy on 1/29/18 by Dr. Paul Nelson--Polyp in the ascending colon. Moderate diverticulosis in the left side of the colon. Normal mucosa in the terminal ileum. Otherwise normal exam. Biopsy unremarkable colonic mucosa. Colon polyp tubular adenoma.   3. EGD on 2/1/18 by Dr. Paul Nelson--Hiatal hernia. Granularity and erythema in the GE junction compatible with esophagitis. Normal mucosa in the whole examined duodenum. Congestion and erythema in the cardia, fundus and stomach body compatible with gastritis. Duodenum biopsy with active duodenitis, no celiac sprue. Stomach antrum biopsy with mild reactive gatropathic changes in background of mild chronic gastritis. No H. pylori. GE junction biopsy unremarkable squamous mucosa.      Treatment History:   1. Weekly Rituximab X 4 [id number]/17/14--12/18/14, dose #4 delayed due to persistent diarrhea  2. Bendamustine/Ritixumab x 6 cycles completed 3/27/17---8/15/17.                2. 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   Lovenox BID x 6 months per [facility].      3. 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 Diagnosed on CT 6/28/20.   Eliquis twice daily since June 2020.         Treatment Plan: Observation--[facility] in April 2025.    Chief Complaint: Lymphoma (No complaints)    HPI  Patient presents for telephone (unable to connect via telemedicine) follow-up of low-grade B-cell lymphoma. He is doing very well. Returned to [facility] in April 2023 and CT scans were stable, no evidence of lymphoma. He will return again in 2 years. No B-symptoms. Repeat labs are good and LDH is back down to normal. Continues to complain of his chronic pains for which he was diagnosed with fibromyalgia. No other problems reported  today.     Past Medical History:   Diagnosis Date    Cancer     Hypertension       Review of patient's allergies indicates:   Allergen Reactions    Ketorolac Hives and Rash     Other reaction(s): Not Indicated  Other reaction(s): Not available      Current Outpatient Medications on File Prior to Visit   Medication Sig Dispense Refill    acetaminophen (TYLENOL) 500 MG tablet Take 1,000 mg by mouth.      allopurinoL (ZYLOPRIM) 300 MG tablet allopurinol 300 mg tablet      amitriptyline (ELAVIL) 25 MG tablet amitriptyline 25 mg tablet   Take 1 tablet as needed by oral route at bedtime for 30 days.      amLODIPine (NORVASC) 5 MG tablet Take 5 mg by mouth every evening.      apixaban (ELIQUIS) 5 mg Tab Eliquis 5 mg tablet   TAKE 1 TABLET BY MOUTH TWICE DAILY      atorvastatin (LIPITOR) 20 MG tablet atorvastatin 20 mg tablet   TAKE 1 TABLET BY MOUTH EVERY DAY AT BEDTIME      diclofenac sodium (VOLTAREN) 1 % Gel diclofenac 1 % topical gel   APPLY 2 GRAMS TO THE AFFECTED AREA(S) BY TOPICAL ROUTE 4 TIMES PER DAY AS NEEDED      diphenoxylate-atropine 2.5-0.025 mg (LOMOTIL) 2.5-0.025 mg per tablet Take 1 tablet by mouth.      ELIQUIS 5 mg Tab Take 5 mg by mouth 2 (two) times daily.      furosemide (LASIX) 20 MG tablet Take 20 mg by mouth. for five days      hydroCHLOROthiazide (HYDRODIURIL) 25 MG tablet Take 25 mg by mouth.      HYDROcodone-acetaminophen (NORCO) 7.5-325 mg per tablet hydrocodone 7.5 mg-acetaminophen 325 mg tablet      levocetirizine (XYZAL) 5 MG tablet Take 5 mg by mouth.      losartan (COZAAR) 100 MG tablet losartan 100 mg tablet   TAKE 1 TABLET BY MOUTH ONCE DAILY      methocarbamoL (ROBAXIN) 750 MG Tab methocarbamol 750 mg tablet   Take 1 tablet 3 times a day by oral route as needed for 30 days.      nebivoloL (BYSTOLIC) 5 MG Tab nebivolol 5 mg tablet      ondansetron (ZOFRAN) 4 MG tablet ondansetron HCl 4 mg tablet   TAKE 1 TABLET BY MOUTH EVERY 8 HOURS AS NEEDED FOR NAUSEA      potassium citrate (UROCIT-K  15) 15 mEq TbSR potassium citrate ER 15 mEq (1,620 mg) tablet,extended release      RABEprazole (ACIPHEX) 20 mg tablet rabeprazole 20 mg tablet,delayed release   TAKE 1 TABLET BY MOUTH ONCE DAILY      tamsulosin (FLOMAX) 0.4 mg Cap tamsulosin 0.4 mg capsule      vit A,C &E-lutien-minerals tablet Take 1 capsule by mouth once daily.      vitamin E 1000 UNIT capsule Take 1 tablet by mouth once daily.       No current facility-administered medications on file prior to visit.      Review of Systems   Constitutional:  Negative for appetite change, fatigue, fever and unexpected weight change.   HENT:  Negative for mouth sores.    Eyes: Negative.    Respiratory:  Negative for cough and shortness of breath.    Cardiovascular:  Positive for claudication. Negative for chest pain and leg swelling.   Gastrointestinal:  Negative for abdominal distention, abdominal pain, constipation, diarrhea, nausea, vomiting and reflux.   Genitourinary:  Negative for difficulty urinating, dysuria and hematuria.   Musculoskeletal:  Positive for back pain. Negative for arthralgias.        Chronic back and joint pains   Integumentary:  Negative for rash.   Neurological:  Negative for weakness and headaches.   Hematological:  Negative for adenopathy.   Psychiatric/Behavioral:  Negative for sleep disturbance. The patient is not nervous/anxious.          There were no vitals filed for this visit.    Physical Exam    Lab Visit on 10/23/2023   Component Date Value    Sodium Level 10/23/2023 142     Potassium Level 10/23/2023 4.1     Chloride 10/23/2023 108 (H)     Carbon Dioxide 10/23/2023 26     Glucose Level 10/23/2023 83     Blood Urea Nitrogen 10/23/2023 23.4     Creatinine 10/23/2023 1.72 (H)     Calcium Level Total 10/23/2023 9.1     Protein Total 10/23/2023 6.2     Albumin Level 10/23/2023 3.8     Globulin 10/23/2023 2.4     Albumin/Globulin Ratio 10/23/2023 1.6     Bilirubin Total 10/23/2023 0.6     Alkaline Phosphatase 10/23/2023 56      Alanine Aminotransferase 10/23/2023 19     Aspartate Aminotransfera* 10/23/2023 21     eGFR 10/23/2023 43     Lactate Dehydrogenase 10/23/2023 215     WBC 10/23/2023 5.04     RBC 10/23/2023 4.47 (L)     Hgb 10/23/2023 14.1     Hct 10/23/2023 43.8     MCV 10/23/2023 98.0 (H)     MCH 10/23/2023 31.5 (H)     MCHC 10/23/2023 32.2 (L)     RDW 10/23/2023 13.0     Platelet 10/23/2023 171     MPV 10/23/2023 9.6     Neut % 10/23/2023 65.3     Lymph % 10/23/2023 21.4     Mono % 10/23/2023 10.5     Eos % 10/23/2023 1.6     Basophil % 10/23/2023 0.8     Lymph # 10/23/2023 1.08     Neut # 10/23/2023 3.29     Mono # 10/23/2023 0.53     Eos # 10/23/2023 0.08     Baso # 10/23/2023 0.04     IG# 10/23/2023 0.02     IG% 10/23/2023 0.4           Assessment:       1. Low grade B-cell lymphoma         Plan:       Patient with h/o NHL low-grade B-cell with intra-abdominal lymph nodes, completed 4 cycles Rituximab on 12/18/14.   Had difficulty with severe diarrhea. Diarrhea finally resolved after treatment with H. pylori.  He was followed at [facility] with surveillance scanning and scan from 3/9/17 showed some mild progression.     Treatment with Benadmustine/Rituximab recommended and started on 3/27/17.  Patient completed 6 cycles on 8/15/17.   PET/CT from 9/11/17 showed CR.  No maintenance Rituximab recommended due to h/o diarrhea.     Currently patient is doing well without any signs or symptoms to suggest recurrence of disease by labs or physical exam findings.  CT of neck/C/A/P on 11/10/22 with no evidence of lymphoma, only a few lung nodules which have been present.   Most recent CT of neck/C/A/P at [facility] in April 2023 with no evidence of disease. He will return again in 2 years.   Repeat labs with mild renal insufficiency. LDH now normal. Encouraged him to drink more water.   Denies any B symptoms.   Will continue to alternate visits here. Follow-up in 6 months with repeat labs.   All questions answered at this time.  Patient was told to  contact me with any problems before return to clinic.      This was a Telephone visit. The patient consented to the consult held via telephone. The phone call took place with myself and the patient only according to [facility] protocol. I, distant Nurse Practitioner, conducted the visit from [facility]. The patient participated in the visit at a non-[facility] location selected by the patient, identified at his home. I am licensed in the state where the patient stated he was located. The patient stated that he understood and accepted the privacy and security risks to their information at their location. Total time spent on the phone was 10 minutes.       [facility] [place]

## 2023-10-30 ENCOUNTER — OFFICE VISIT (OUTPATIENT)
Dept: HEMATOLOGY/ONCOLOGY | Facility: CLINIC | Age: 68
End: 2023-10-30
Payer: MEDICARE

## 2023-10-30 VITALS — BODY MASS INDEX: 33.59 KG/M2 | HEIGHT: 72 IN | WEIGHT: 248 LBS

## 2023-10-30 DIAGNOSIS — C85.10 LOW GRADE B-CELL LYMPHOMA: Primary | ICD-10-CM

## 2023-10-30 PROCEDURE — 99212 PR OFFICE/OUTPT VISIT, EST, LEVL II, 10-19 MIN: ICD-10-PCS | Mod: 95,,, | Performed by: NURSE PRACTITIONER

## 2023-10-30 PROCEDURE — 99212 OFFICE O/P EST SF 10 MIN: CPT | Mod: 95,,, | Performed by: NURSE PRACTITIONER

## 2023-10-30 RX ORDER — HYDROCHLOROTHIAZIDE 25 MG/1
25 TABLET ORAL
COMMUNITY
Start: 2023-09-26

## 2023-10-30 RX ORDER — FUROSEMIDE 20 MG/1
20 TABLET ORAL
COMMUNITY
Start: 2023-09-25

## 2023-10-30 RX ORDER — LEVOCETIRIZINE DIHYDROCHLORIDE 5 MG/1
5 TABLET, FILM COATED ORAL
COMMUNITY
Start: 2023-09-10

## 2024-05-06 NOTE — PROGRESS NOTES
Subjective:       Patient ID: Chapin Nelson is a 69 y.o. male.    Urologist: Dr. Paul Unger  [facility] oncologist: Dr. Ifeoma Gu  GI: Dr. Paul Nelson  PCP: Dr. 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 CT-guided core biopsy retroperitoneal mass/lymph node done 14--low-grade B-cell lymphoma, CD20+, CD79a+, cyclin D1 negative, CD10-, BCL-6 negative, BCL-2 slightly positive. Marginal zone lymphoma vs. grade 1 follicular lymphoma.  2. Pelvic mass biopsy repeated at [facility] 10/29/14--lymphoid cells BCL-2 positive, BCL-6 and CD 10 negative, CD20 positive. Differential diagnosis includes low grade follicular lymphoma and marginal zone lymphoma, distinction is challenging.  3. Bone marrow biopsy done at [facility] 10/17/14--cellular bone marrow 30-40% with adequate trilineage hematopoiesis. No diagnostic morphologic evidence of involvement by lymphoproliferative disorder. Small population of kappa monotypic B-cells <1% of events. These findings suggest possible involvement.1 metaphase pseudodiploid, others normal.  Imagin. CT abdomen/pelvis w/ contrast done at [facility] 14--bilateral non-obstructing nephrolithiasis, 3.5X5.9cm retroperitoneal soft tissue encasing the left ureter, left common and external iliac vasculature. A few small left periaortic infrarenal lymph nodes are seen with mild surrounding fat stranding--largest measures 1.5X1.3cm.  2. PET/CT done 10/16/14--Left para-aortic adenopathy measures 1.5X1.6cm, soft tissue thickening noted in right hemipelvis, along the left external and internal iliac vessels measures 2.3X7.4cm.   3. PET/CT done 3/5/15--Interval improvement with decreasing size and activity of abdominal lymphoma.  4. CT neck/C/A/P done 6/5/15--nonspecific 12mm left supraclavicular node may be reactive; treated disease involving left common iliac and left external iliac region unchanged, slight increase  in soft tissue from March not excluded. Definite improvement when compared to 10/2014.  5. CT neck/C/A/P done 9/9/15--no cervical adenopathy, partial thrombosis left external jugular vein; no change in soft tissue thickening along left iliac vessels c/w treated disease. No new findings.  6. PET 3/9/17--anatomically stable mildly FDG avid left common/external iliac soft tissue thickening measures 7.1X2.0cm and retroperitoneal lymph nodes. (increasing uptake), concern for a small mediastinal lymph node involvement.  7. PET/CT 9/11/17--No suspicious FDG-avid lesions identified, lymphoma 5-point scale score = 1.  8. CT neck/C/A/P done 2/28/18 at [facility]-No evidence of lymphoma.  9. CT C/A/P done at [facility] 9/20/18--DEMETRIA, stable multiple kidney stones bilaterally with cortical atrophy of left kidney.  10. CT neck/C/A/P done at [facility] on 3/25/19--No new progressive lymphadenopathy chest, abdomen and pelvis. No cervical lymphadenopathy.   11. CT C/A/P at [facility] on 6/28/20--Filing defect in the superior vena cava. Suggestion of the venous thrombosis associated with the central venous line. Stable lymph nodes in the chest. Grossly stable pulmonary nodules.   12. CT C/A/P at [facility] on 4/25/21--No evidence of active lymphoma in the chest, abdomen, and pelvis. 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 Interval radiation seed placement within the superior prostate gland. Two seeds appear to partially infiltrate into the bladder base.   13. CT C/A/P at [facility] 4/24/22--no change or evidence of residual or recurrent disease, bilateral nonobstructing nephrolithiasis noted.  14. CT N/C/A/P on 11/10/22--No cervical lymphadenopathy. No lymphadenopathy. Tiny, sub 6 mm pulmonary nodules. Patient reportedly has numerous outside prior CTs.  Recommend outside priors.  15. CT N/C/A/P at [facility] on 4/9/23--No evidence for either lymphoma the neck. No acute sinusitis. No CT evidence of recurrent  lymphoma in the chest, abdomen or pelvis. Bilateral nephrolithiasis and incidental findings as discussed above, similar to the previous exam.     Procedures:  1. Mediport placed on 4/21/17 in [place]. Removed July 2020.   2. Colonoscopy on 1/29/18 by Dr. Paul Nelson--Polyp in the ascending colon. Moderate diverticulosis in the left side of the colon. Normal mucosa in the terminal ileum. Otherwise normal exam. Biopsy unremarkable colonic mucosa. Colon polyp tubular adenoma.   3. EGD on 2/1/18 by Dr. Paul Nelson--Hiatal hernia. Granularity and erythema in the GE junction compatible with esophagitis. Normal mucosa in the whole examined duodenum. Congestion and erythema in the cardia, fundus and stomach body compatible with gastritis. Duodenum biopsy with active duodenitis, no celiac sprue. Stomach antrum biopsy with mild reactive gatropathic changes in background of mild chronic gastritis. No H. pylori. GE junction biopsy unremarkable squamous mucosa.      Treatment History:   1. Weekly Rituximab X 4 uekwldilt22/17/14--12/18/14, dose #4 delayed due to persistent diarrhea  2. Bendamustine/Ritixumab x 6 cycles completed 3/27/17---8/15/17.                2. 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   Lovenox BID x 6 months per [facility].      3. 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 Diagnosed on CT 6/28/20.   Eliquis twice daily since June 2020.         Treatment Plan: Observation--[facility] in April 2025.    Chief Complaint: Other Misc (Pt reports no new concerns today.)    HPI  Patient presents for follow-up of lymphoma. Reports no problems since our last visit. He denies any B-symptoms. Still has his chronic neck and back pain, unchanged. Recent labs all good.     Past Medical History:   Diagnosis Date    Cancer     Hypertension       Review of patient's allergies indicates:   Allergen Reactions    Ketorolac Hives and Rash     Other reaction(s): Not Indicated  Other  reaction(s): Not available      Current Outpatient Medications on File Prior to Visit   Medication Sig Dispense Refill    acetaminophen (TYLENOL) 500 MG tablet Take 1,000 mg by mouth.      allopurinoL (ZYLOPRIM) 300 MG tablet allopurinol 300 mg tablet      amLODIPine (NORVASC) 5 MG tablet Take 5 mg by mouth every evening.      apixaban (ELIQUIS) 5 mg Tab Eliquis 5 mg tablet   TAKE 1 TABLET BY MOUTH TWICE DAILY      atorvastatin (LIPITOR) 20 MG tablet atorvastatin 20 mg tablet   TAKE 1 TABLET BY MOUTH EVERY DAY AT BEDTIME      diclofenac sodium (VOLTAREN) 1 % Gel diclofenac 1 % topical gel   APPLY 2 GRAMS TO THE AFFECTED AREA(S) BY TOPICAL ROUTE 4 TIMES PER DAY AS NEEDED      HYDROcodone-acetaminophen (NORCO) 7.5-325 mg per tablet hydrocodone 7.5 mg-acetaminophen 325 mg tablet      losartan (COZAAR) 100 MG tablet losartan 100 mg tablet   TAKE 1 TABLET BY MOUTH ONCE DAILY      nebivoloL (BYSTOLIC) 5 MG Tab nebivolol 5 mg tablet      ondansetron (ZOFRAN) 4 MG tablet ondansetron HCl 4 mg tablet   TAKE 1 TABLET BY MOUTH EVERY 8 HOURS AS NEEDED FOR NAUSEA      potassium citrate (UROCIT-K 15) 15 mEq TbSR potassium citrate ER 15 mEq (1,620 mg) tablet,extended release      RABEprazole (ACIPHEX) 20 mg tablet rabeprazole 20 mg tablet,delayed release   TAKE 1 TABLET BY MOUTH ONCE DAILY      tamsulosin (FLOMAX) 0.4 mg Cap tamsulosin 0.4 mg capsule      vitamin E 1000 UNIT capsule Take 1 tablet by mouth once daily.      amitriptyline (ELAVIL) 25 MG tablet amitriptyline 25 mg tablet   Take 1 tablet as needed by oral route at bedtime for 30 days. (Patient not taking: Reported on 5/14/2024)      diphenoxylate-atropine 2.5-0.025 mg (LOMOTIL) 2.5-0.025 mg per tablet Take 1 tablet by mouth. (Patient not taking: Reported on 5/14/2024)      ELIQUIS 5 mg Tab Take 5 mg by mouth 2 (two) times daily. (Patient not taking: Reported on 5/14/2024)      furosemide (LASIX) 20 MG tablet Take 20 mg by mouth. for five days (Patient not taking:  Reported on 5/14/2024)      hydroCHLOROthiazide (HYDRODIURIL) 25 MG tablet Take 25 mg by mouth. (Patient not taking: Reported on 5/14/2024)      levocetirizine (XYZAL) 5 MG tablet Take 5 mg by mouth. (Patient not taking: Reported on 5/14/2024)      methocarbamoL (ROBAXIN) 750 MG Tab methocarbamol 750 mg tablet   Take 1 tablet 3 times a day by oral route as needed for 30 days. (Patient not taking: Reported on 5/14/2024)      vit A,C &E-lutien-minerals tablet Take 1 capsule by mouth once daily. (Patient not taking: Reported on 5/14/2024)       No current facility-administered medications on file prior to visit.      Review of Systems   Constitutional:  Negative for appetite change, fatigue, fever and unexpected weight change.   HENT:  Negative for mouth sores.    Eyes: Negative.    Respiratory:  Negative for cough and shortness of breath.    Cardiovascular:  Positive for claudication. Negative for chest pain and leg swelling.   Gastrointestinal:  Negative for abdominal distention, abdominal pain, constipation, diarrhea, nausea, vomiting and reflux.   Genitourinary:  Negative for difficulty urinating, dysuria and hematuria.   Musculoskeletal:  Positive for back pain. Negative for arthralgias.        Chronic back and joint pains   Integumentary:  Negative for rash.   Neurological:  Negative for weakness and headaches.   Hematological:  Negative for adenopathy.   Psychiatric/Behavioral:  Negative for sleep disturbance. The patient is not nervous/anxious.          Vitals:    05/14/24 1257   BP: 134/85   Pulse: 74   Resp: 14   Temp: 97.8 °F (36.6 °C)       Physical Exam  Constitutional:       General: He is awake.      Appearance: Normal appearance.   HENT:      Head: Normocephalic and atraumatic.      Nose: Nose normal.      Mouth/Throat:      Mouth: Mucous membranes are moist.   Eyes:      General: Vision grossly intact.      Extraocular Movements: Extraocular movements intact.      Conjunctiva/sclera: Conjunctivae  normal.   Cardiovascular:      Rate and Rhythm: Normal rate and regular rhythm.      Heart sounds: Normal heart sounds.   Pulmonary:      Effort: Pulmonary effort is normal.      Breath sounds: Normal breath sounds.   Abdominal:      General: Bowel sounds are normal. There is no distension.      Palpations: Abdomen is soft.      Tenderness: There is no abdominal tenderness.   Musculoskeletal:      Cervical back: Normal range of motion and neck supple.      Right lower leg: No edema.      Left lower leg: No edema.   Lymphadenopathy:      Cervical: No cervical adenopathy.      Upper Body:      Right upper body: No supraclavicular adenopathy.      Left upper body: No supraclavicular adenopathy.   Skin:     General: Skin is warm.   Neurological:      Mental Status: He is alert and oriented to person, place, and time.      Motor: Motor function is intact.   Psychiatric:         Mood and Affect: Mood normal.         Speech: Speech normal.         Behavior: Behavior is cooperative.         Judgment: Judgment normal.         Lab Visit on 05/13/2024   Component Date Value    Sodium 05/13/2024 144     Potassium 05/13/2024 4.2     Chloride 05/13/2024 109 (H)     CO2 05/13/2024 27     Glucose 05/13/2024 100     Blood Urea Nitrogen 05/13/2024 22.0     Creatinine 05/13/2024 1.13     Calcium 05/13/2024 8.8     Protein Total 05/13/2024 6.1     Albumin 05/13/2024 3.6     Globulin 05/13/2024 2.5     Albumin/Globulin Ratio 05/13/2024 1.4     Bilirubin Total 05/13/2024 0.4     ALP 05/13/2024 68     ALT 05/13/2024 16     AST 05/13/2024 17     eGFR 05/13/2024 >60     Lactate Dehydrogenase 05/13/2024 173     WBC 05/13/2024 5.15     RBC 05/13/2024 4.81     Hgb 05/13/2024 15.2     Hct 05/13/2024 45.8     MCV 05/13/2024 95.2 (H)     MCH 05/13/2024 31.6 (H)     MCHC 05/13/2024 33.2     RDW 05/13/2024 13.3     Platelet 05/13/2024 164     MPV 05/13/2024 9.6     Neut % 05/13/2024 62.3     Lymph % 05/13/2024 22.7     Mono % 05/13/2024 10.9      Eos % 05/13/2024 2.5     Basophil % 05/13/2024 1.0     Lymph # 05/13/2024 1.17     Neut # 05/13/2024 3.21     Mono # 05/13/2024 0.56     Eos # 05/13/2024 0.13     Baso # 05/13/2024 0.05     IG# 05/13/2024 0.03     IG% 05/13/2024 0.6           Assessment:       1. Low grade B-cell lymphoma      Plan:       Patient with h/o NHL low-grade B-cell with intra-abdominal lymph nodes, completed 4 cycles Rituximab on 12/18/14.   Had difficulty with severe diarrhea. Diarrhea finally resolved after treatment with H. pylori.  He was followed at [facility] with surveillance scanning and scan from 3/9/17 showed some mild progression.     Treatment with Benadmustine/Rituximab recommended and started on 3/27/17.  Patient completed 6 cycles on 8/15/17.   PET/CT from 9/11/17 showed CR.  No maintenance Rituximab recommended due to h/o diarrhea.     Currently patient is doing well without any signs or symptoms to suggest recurrence of disease by labs or physical exam findings.    Most recent CT of neck/C/A/P at [facility] in April 2023 with no evidence of disease. He will return again in 2 years.   Recent labs all good. LDH remains WNL.   Denies any B symptoms.   Will continue to alternate visits here and [facility].   Returns to [facility] in 4/2025 for scans and visit.  Will have patient RTC here in 2 years for follow--up visit with labs only. Can call and come in sooner if anything changes.   All questions answered at this time.  Patient was told to contact me with any problems before return to clinic.             Sharron De Jesus MD

## 2024-05-13 ENCOUNTER — LAB VISIT (OUTPATIENT)
Dept: LAB | Facility: HOSPITAL | Age: 69
End: 2024-05-13
Attending: INTERNAL MEDICINE
Payer: MEDICARE

## 2024-05-13 DIAGNOSIS — C85.10 LOW GRADE B-CELL LYMPHOMA: ICD-10-CM

## 2024-05-13 LAB
ALBUMIN SERPL-MCNC: 3.6 G/DL (ref 3.4–4.8)
ALBUMIN/GLOB SERPL: 1.4 RATIO (ref 1.1–2)
ALP SERPL-CCNC: 68 UNIT/L (ref 40–150)
ALT SERPL-CCNC: 16 UNIT/L (ref 0–55)
AST SERPL-CCNC: 17 UNIT/L (ref 5–34)
BASOPHILS # BLD AUTO: 0.05 X10(3)/MCL
BASOPHILS NFR BLD AUTO: 1 %
BILIRUB SERPL-MCNC: 0.4 MG/DL
BUN SERPL-MCNC: 22 MG/DL (ref 8.4–25.7)
CALCIUM SERPL-MCNC: 8.8 MG/DL (ref 8.8–10)
CHLORIDE SERPL-SCNC: 109 MMOL/L (ref 98–107)
CO2 SERPL-SCNC: 27 MMOL/L (ref 23–31)
CREAT SERPL-MCNC: 1.13 MG/DL (ref 0.73–1.18)
EOSINOPHIL # BLD AUTO: 0.13 X10(3)/MCL (ref 0–0.9)
EOSINOPHIL NFR BLD AUTO: 2.5 %
ERYTHROCYTE [DISTWIDTH] IN BLOOD BY AUTOMATED COUNT: 13.3 % (ref 11.5–17)
GFR SERPLBLD CREATININE-BSD FMLA CKD-EPI: >60 ML/MIN/1.73/M2
GLOBULIN SER-MCNC: 2.5 GM/DL (ref 2.4–3.5)
GLUCOSE SERPL-MCNC: 100 MG/DL (ref 82–115)
HCT VFR BLD AUTO: 45.8 % (ref 42–52)
HGB BLD-MCNC: 15.2 G/DL (ref 14–18)
IMM GRANULOCYTES # BLD AUTO: 0.03 X10(3)/MCL (ref 0–0.04)
IMM GRANULOCYTES NFR BLD AUTO: 0.6 %
LDH SERPL-CCNC: 173 U/L (ref 125–220)
LYMPHOCYTES # BLD AUTO: 1.17 X10(3)/MCL (ref 0.6–4.6)
LYMPHOCYTES NFR BLD AUTO: 22.7 %
MCH RBC QN AUTO: 31.6 PG (ref 27–31)
MCHC RBC AUTO-ENTMCNC: 33.2 G/DL (ref 33–36)
MCV RBC AUTO: 95.2 FL (ref 80–94)
MONOCYTES # BLD AUTO: 0.56 X10(3)/MCL (ref 0.1–1.3)
MONOCYTES NFR BLD AUTO: 10.9 %
NEUTROPHILS # BLD AUTO: 3.21 X10(3)/MCL (ref 2.1–9.2)
NEUTROPHILS NFR BLD AUTO: 62.3 %
PLATELET # BLD AUTO: 164 X10(3)/MCL (ref 130–400)
PMV BLD AUTO: 9.6 FL (ref 7.4–10.4)
POTASSIUM SERPL-SCNC: 4.2 MMOL/L (ref 3.5–5.1)
PROT SERPL-MCNC: 6.1 GM/DL (ref 5.8–7.6)
RBC # BLD AUTO: 4.81 X10(6)/MCL (ref 4.7–6.1)
SODIUM SERPL-SCNC: 144 MMOL/L (ref 136–145)
WBC # SPEC AUTO: 5.15 X10(3)/MCL (ref 4.5–11.5)

## 2024-05-13 PROCEDURE — 36415 COLL VENOUS BLD VENIPUNCTURE: CPT

## 2024-05-13 PROCEDURE — 85025 COMPLETE CBC W/AUTO DIFF WBC: CPT

## 2024-05-13 PROCEDURE — 83615 LACTATE (LD) (LDH) ENZYME: CPT

## 2024-05-13 PROCEDURE — 80053 COMPREHEN METABOLIC PANEL: CPT

## 2024-05-14 ENCOUNTER — OFFICE VISIT (OUTPATIENT)
Dept: HEMATOLOGY/ONCOLOGY | Facility: CLINIC | Age: 69
End: 2024-05-14
Payer: MEDICARE

## 2024-05-14 VITALS
TEMPERATURE: 98 F | WEIGHT: 256.31 LBS | BODY MASS INDEX: 34.72 KG/M2 | SYSTOLIC BLOOD PRESSURE: 134 MMHG | RESPIRATION RATE: 14 BRPM | OXYGEN SATURATION: 95 % | DIASTOLIC BLOOD PRESSURE: 85 MMHG | HEIGHT: 72 IN | HEART RATE: 74 BPM

## 2024-05-14 DIAGNOSIS — C85.10 LOW GRADE B-CELL LYMPHOMA: Primary | ICD-10-CM

## 2024-05-14 PROCEDURE — 99999 PR PBB SHADOW E&M-EST. PATIENT-LVL V: CPT | Mod: PBBFAC,,, | Performed by: INTERNAL MEDICINE

## 2024-05-14 PROCEDURE — 99214 OFFICE O/P EST MOD 30 MIN: CPT | Mod: S$PBB,,, | Performed by: INTERNAL MEDICINE

## 2024-05-14 PROCEDURE — 99215 OFFICE O/P EST HI 40 MIN: CPT | Mod: PBBFAC | Performed by: INTERNAL MEDICINE
